# Patient Record
Sex: MALE | Race: WHITE | NOT HISPANIC OR LATINO | Employment: STUDENT | URBAN - METROPOLITAN AREA
[De-identification: names, ages, dates, MRNs, and addresses within clinical notes are randomized per-mention and may not be internally consistent; named-entity substitution may affect disease eponyms.]

---
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
## 2022-01-03 NOTE — PROGRESS NOTES
"[facility]   Physical Therapy Treatment Note     Name: Huber Mahajan  Clinic Number: [id number]    Therapy Diagnosis:   Encounter Diagnoses   Name Primary?    Hip pain     Generalized weakness     Decreased range of motion      Physician: Baldo Davis    Visit Date: 1/5/2022    Physician Orders: PT Eval and Treat   Medical Diagnosis from Referral: S73.191A (ICD-10-CM) - Tear of right acetabular labrum, initial encounter  Evaluation Date: 12/29/2021  Authorization Period Expiration: 12/31/21  Plan of Care Expiration: 3/29/22  Visit # / Visits authorized: 1/ 1     PN DUE: 1/29/22    PTA Visit #: 0/5     Precautions: Standard        Right hip athroscopy with labral repair, femoroplasty, acetabuloplasty  Hip arthroscopy and labral repair protocol  eval and treat with modalities as needed         DOS: 12/20/21  POW: 3  50% weightbearing on the operative extremity for 4 weeks, limit excessive hip flexion and ER/IR    Time In: 1:45  Time Out: 2:30  Total Billable Time: 45 minutes    SUBJECTIVE   History of current condition - Huber reports: to PT s/p R labrum repair on 12/20/21. He states that his initial injury happened about 2 years ago playing football. Pt states that he stepping a hole during a tackling drill and felt an uncomfortable pop in his hip. He states that he kept trying to play on his hip, but later found it hard to squat or do any form of LE work outs. He states that he wanted to maybe try to go back and play football so he elected to have hip surgery. He states that since his surgery his hip has been feeling better, but that it is more "sore" Pt states that he will be going back to school in mid January and will need to transfer to the [facility] at that time.     Pt reports: hip is feeling good. Doing exercises at home with no issues. Has primary c/o lower leg and ankle pain from surgery and compression.  He was compliant with home exercise " program.  Response to previous treatment: did ok  Functional change: walking better with crutches and 50% wb    Pain: 3/10  Location: right groin      OBJECTIVE     Objective Measures updated at progress report unless specified.   GAIT DEVIATIONS: pt ambulates in clinic NWB on B axillary crutches      Hip Range of Motion: ROM testing limited today per protocol and to protect surgical site  Joint Mobility: hypomobility and guarding in R hip     Treatment     Huber received the treatments listed below:      Huber received therapeutic exercises to develop strength, endurance, ROM, flexibility and posture for 40 minutes including:     HEP issued, reviewed, and education provided     glute sets in HL x 20   QS x 20  HS sets 2 x 20 with foam roll under knees  Adductor isometric with ball in HL 2  x 20  Abduction iso with belt 2 X 20  Bridge with ball squeeze 3 X 15  LAQ 3 X 15  Prone knee flexion HS curls 3 X 15     Standing Exercises:  Standing on LLE and moving R:   Abduction x 20  SLR within restricted ROM x 20  B Heel raises 2 X20     Manual STM/joint mobs distal lower leg X 5 min    Patient Education and Home Exercises     Home Exercises Provided and Patient Education Provided     Education provided:   - HEP  Protocol  WB status    Written Home Exercises Provided: Patient instructed to cont prior HEP. Exercises were reviewed and Huber was able to demonstrate them prior to the end of the session.  Huber demonstrated good  understanding of the education provided. See EMR under Patient Instructions for exercises provided during therapy sessions    ASSESSMENT     Pt tolerated activities well today. Responded to STM/ ankle ROM and stretches. Pt demonstrates good understanding re WB status and precautions.    Huber Is progressing well towards his goals.   Pt prognosis is Excellent.     Pt will continue to benefit from skilled outpatient physical therapy to address the deficits listed in the problem list box on initial  evaluation, provide pt/family education and to maximize pt's level of independence in the home and community environment.     Pt's spiritual, cultural and educational needs considered and pt agreeable to plan of care and goals.     Anticipated barriers to physical therapy: none    GOALS: Short Term Goals:  6 weeks  1.Report decreased in pain at worst less than <   / =  2  /10  to increase tolerance for functional activities.   2. Pt to improve R hip flexion range of motion to 90 to allow for improved functional mobility to allow for improvement in IADLs.   3. Increased R hip abduction  MMT 1/2  to increase tolerance for ADL and work activities.  4. Pt to report ability to walk with no AD and no increased pain in R hip   5. Pt will be independent with HEP performance in order to continue PT progress at home.         Long Term Goals:  8-12 weeks  1.Report decreased in pain at worst less than  <   / =  0  /10  to increase tolerance for functional mobility  2.Pt will report ability to perform squat with no pain in R hip   3.Increased R hip flexion and extension MMT 1 grade  to increase tolerance for ADL and work activities.  4. Pt will report 20 % limitation or less on FOTO hip survey  to demonstrate increase in LE function with every day tasks.   5. Pt to be Independent with progressed HEP      PLAN   Plan of care Certification: 12/29/2021 to 3/29/22.     Continue per protocol to increase ROM, strength and WB per protocol and tolerance    Shauna Carrillo, PT

## 2022-01-04 ENCOUNTER — OFFICE VISIT (OUTPATIENT)
Dept: ORTHOPEDICS | Facility: CLINIC | Age: 22
End: 2022-01-04
Payer: COMMERCIAL

## 2022-01-04 VITALS — BODY MASS INDEX: 26.95 KG/M2 | WEIGHT: 199 LBS | HEIGHT: 72 IN

## 2022-01-04 DIAGNOSIS — M25.851 FEMOROACETABULAR IMPINGEMENT OF RIGHT HIP: Primary | ICD-10-CM

## 2022-01-04 PROCEDURE — 3008F PR BODY MASS INDEX (BMI) DOCUMENTED: ICD-10-PCS | Mod: CPTII,S$GLB,, | Performed by: STUDENT IN AN ORGANIZED HEALTH CARE EDUCATION/TRAINING PROGRAM

## 2022-01-04 PROCEDURE — 1160F RVW MEDS BY RX/DR IN RCRD: CPT | Mod: CPTII,S$GLB,, | Performed by: STUDENT IN AN ORGANIZED HEALTH CARE EDUCATION/TRAINING PROGRAM

## 2022-01-04 PROCEDURE — 1159F PR MEDICATION LIST DOCUMENTED IN MEDICAL RECORD: ICD-10-PCS | Mod: CPTII,S$GLB,, | Performed by: STUDENT IN AN ORGANIZED HEALTH CARE EDUCATION/TRAINING PROGRAM

## 2022-01-04 PROCEDURE — 99024 POSTOP FOLLOW-UP VISIT: CPT | Mod: S$GLB,,, | Performed by: STUDENT IN AN ORGANIZED HEALTH CARE EDUCATION/TRAINING PROGRAM

## 2022-01-04 PROCEDURE — 99999 PR PBB SHADOW E&M-EST. PATIENT-LVL III: CPT | Mod: PBBFAC,,, | Performed by: STUDENT IN AN ORGANIZED HEALTH CARE EDUCATION/TRAINING PROGRAM

## 2022-01-04 PROCEDURE — 1159F MED LIST DOCD IN RCRD: CPT | Mod: CPTII,S$GLB,, | Performed by: STUDENT IN AN ORGANIZED HEALTH CARE EDUCATION/TRAINING PROGRAM

## 2022-01-04 PROCEDURE — 99024 PR POST-OP FOLLOW-UP VISIT: ICD-10-PCS | Mod: S$GLB,,, | Performed by: STUDENT IN AN ORGANIZED HEALTH CARE EDUCATION/TRAINING PROGRAM

## 2022-01-04 PROCEDURE — 1160F PR REVIEW ALL MEDS BY PRESCRIBER/CLIN PHARMACIST DOCUMENTED: ICD-10-PCS | Mod: CPTII,S$GLB,, | Performed by: STUDENT IN AN ORGANIZED HEALTH CARE EDUCATION/TRAINING PROGRAM

## 2022-01-04 PROCEDURE — 3008F BODY MASS INDEX DOCD: CPT | Mod: CPTII,S$GLB,, | Performed by: STUDENT IN AN ORGANIZED HEALTH CARE EDUCATION/TRAINING PROGRAM

## 2022-01-04 PROCEDURE — 99999 PR PBB SHADOW E&M-EST. PATIENT-LVL III: ICD-10-PCS | Mod: PBBFAC,,, | Performed by: STUDENT IN AN ORGANIZED HEALTH CARE EDUCATION/TRAINING PROGRAM

## 2022-01-04 NOTE — PROGRESS NOTES
Orthopaedics  Post-operative follow-up    Procedure Performed:   1. Right hip arthroscopic labral repair  2. Right hip arthroscopic femoroplasty  3. Right hip arthroscopic acetabuloplasty  4. Right hip arthroscopic capsular closure    Date of Surgery: 12/20/2021    Subjective: Huber Mahajan is now about 2 weeks out from his hip surgery.  Overall he is doing well. He reports that he has started PT and his hip is doing well. He has been following post-op weight bearing restrictions. He endorses some right knee and ankle pain after surgery that is steadily improving.    Exam:  Sutures removed, portal sites benign with no drainage or redness  Tolerates gentle hip ROM  Ambulating with crutches without difficulty.  No knee effusion, full knee ROM  Vickie's sign negative and no calf tenderness  Full ankle ROM, slightly diminished sensation over the dorsum of the foot compared with the contralateral side    Impression:  Right hip arthroscopic labral repair, femoroplasty, acetabuloplasty, capsular closure, routine healing    Plan:  Discussed surgical findings, operative procedure  Reviewed post-operative instructions, rehabilitation  We discussed his ankle and knee symptoms. They are steadily improving and I anticipate that the symptoms will continue to improve over time.  Weight bearing status: 50% weight bearing  Symptomatic treatment for pain / swelling  Instructed patient to call clinic if questions or concerns    Follow-up with me in 1 month      Baldo Davis MD

## 2022-01-05 ENCOUNTER — CLINICAL SUPPORT (OUTPATIENT)
Dept: REHABILITATION | Facility: HOSPITAL | Age: 22
End: 2022-01-05
Payer: COMMERCIAL

## 2022-01-05 DIAGNOSIS — M25.60 DECREASED RANGE OF MOTION: ICD-10-CM

## 2022-01-05 DIAGNOSIS — M25.559 HIP PAIN: ICD-10-CM

## 2022-01-05 DIAGNOSIS — R53.1 GENERALIZED WEAKNESS: ICD-10-CM

## 2022-01-06 NOTE — PROGRESS NOTES
"[facility]   Physical Therapy Treatment Note     Name: Huber Mahajan  Clinic Number: [id number]    Therapy Diagnosis:   Encounter Diagnoses   Name Primary?    Hip pain     Generalized weakness     Decreased range of motion      Physician: Baldo Davis    Visit Date: 1/7/2022    Physician Orders: PT Eval and Treat   Medical Diagnosis from Referral: S73.191A (ICD-10-CM) - Tear of right acetabular labrum, initial encounter  Evaluation Date: 12/29/2021  Authorization Period Expiration: 12/31/21  Plan of Care Expiration: 3/29/22  Visit # / Visits authorized: 2/ 20     PN DUE: 1/29/22    PTA Visit #: 1/5     Precautions: Standard        Right hip athroscopy with labral repair, femoroplasty, acetabuloplasty  Hip arthroscopy and labral repair protocol  eval and treat with modalities as needed         DOS: 12/20/21  POW: 3  50% weightbearing on the operative extremity for 4 weeks, limit excessive hip flexion and ER/IR    Time In: 9:45am  Time Out: 10:30am  Total Billable Time: 45 minutes    SUBJECTIVE   History of current condition - Huber reports: to PT s/p R labrum repair on 12/20/21. He states that his initial injury happened about 2 years ago playing football. Pt states that he stepping a hole during a tackling drill and felt an uncomfortable pop in his hip. He states that he kept trying to play on his hip, but later found it hard to squat or do any form of LE work outs. He states that he wanted to maybe try to go back and play football so he elected to have hip surgery. He states that since his surgery his hip has been feeling better, but that it is more "sore" Pt states that he will be going back to school in mid January and will need to transfer to the [facility] at that time.     Pt reports: Not as much soreness after last time, doing good today. Ankle is feeling better after last time.    He was compliant with home exercise program.  Response to previous treatment: " did ok  Functional change: walking better with crutches and 50% wb    Pain: 3/10  Location: right groin      OBJECTIVE     Objective Measures updated at progress report unless specified.       Treatment     Huber received the treatments listed below:      Huber received therapeutic exercises to develop strength, endurance, ROM, flexibility and posture for 45 minutes including:     HEP issued, reviewed, and education provided     glute sets in HL x 20   QS x 20  HS sets 2 x 20 with foam roll under knees  Adductor isometric with ball in HL 2  x 20  Abduction iso with belt 2 X 20  Bridge with ball squeeze 3 X 15  LAQ 3 X 15  Prone knee flexion HS curls 3 X 15     Standing Exercises:  Standing on LLE and moving R:   Abduction x 20  SLR within restricted ROM x 20  B Heel raises 2 X20     Manual STM/joint mobs distal lower leg X 0 min    Patient Education and Home Exercises     Home Exercises Provided and Patient Education Provided     Education provided:   - HEP  Protocol  WB status    Written Home Exercises Provided: Patient instructed to cont prior HEP. Exercises were reviewed and Huber was able to demonstrate them prior to the end of the session.  Huber demonstrated good  understanding of the education provided. See EMR under Patient Instructions for exercises provided during therapy sessions    ASSESSMENT     Pt presents today reporting low soreness after last visit and no new c/o. Cont with exercises from previous session to good tolerance. Pt with good muscular fatigue during each exercise. Pt is continuing to progress well with therapy and maintaining his precautions well.    Huber Is progressing well towards his goals.   Pt prognosis is Excellent.     Pt will continue to benefit from skilled outpatient physical therapy to address the deficits listed in the problem list box on initial evaluation, provide pt/family education and to maximize pt's level of independence in the home and community environment.      Pt's spiritual, cultural and educational needs considered and pt agreeable to plan of care and goals.     Anticipated barriers to physical therapy: none    GOALS: Short Term Goals:  6 weeks  1.Report decreased in pain at worst less than <   / =  2  /10  to increase tolerance for functional activities.   2. Pt to improve R hip flexion range of motion to 90 to allow for improved functional mobility to allow for improvement in IADLs.   3. Increased R hip abduction  MMT 1/2  to increase tolerance for ADL and work activities.  4. Pt to report ability to walk with no AD and no increased pain in R hip   5. Pt will be independent with HEP performance in order to continue PT progress at home.         Long Term Goals:  8-12 weeks  1.Report decreased in pain at worst less than  <   / =  0  /10  to increase tolerance for functional mobility  2.Pt will report ability to perform squat with no pain in R hip   3.Increased R hip flexion and extension MMT 1 grade  to increase tolerance for ADL and work activities.  4. Pt will report 20 % limitation or less on FOTO hip survey  to demonstrate increase in LE function with every day tasks.   5. Pt to be Independent with progressed HEP      PLAN   Plan of care Certification: 12/29/2021 to 3/29/22.     Continue per protocol to increase ROM, strength and WB per protocol and tolerance    Nino RAJESH Chery

## 2022-01-07 ENCOUNTER — CLINICAL SUPPORT (OUTPATIENT)
Dept: REHABILITATION | Facility: HOSPITAL | Age: 22
End: 2022-01-07
Payer: COMMERCIAL

## 2022-01-07 DIAGNOSIS — M25.60 DECREASED RANGE OF MOTION: ICD-10-CM

## 2022-01-07 DIAGNOSIS — R53.1 GENERALIZED WEAKNESS: ICD-10-CM

## 2022-01-07 DIAGNOSIS — M25.559 HIP PAIN: ICD-10-CM

## 2022-01-07 PROCEDURE — 97110 THERAPEUTIC EXERCISES: CPT | Mod: PN,CQ

## 2022-01-07 NOTE — PROGRESS NOTES
"[facility]   Physical Therapy Treatment Note     Name: Huber Mahajan  Clinic Number: [id number]    Therapy Diagnosis:   No diagnosis found.  Physician: Baldo Davis*    Visit Date: 1/10/2022    Physician Orders: PT Eval and Treat   Medical Diagnosis from Referral: S73.191A (ICD-10-CM) - Tear of right acetabular labrum, initial encounter  Evaluation Date: 12/29/2021  Authorization Period Expiration: 12/31/21  Plan of Care Expiration: 3/29/22  Visit # / Visits authorized: 2/ 20     PN DUE: 1/29/22    PTA Visit #: 0/5     Precautions: Standard        Right hip athroscopy with labral repair, femoroplasty, acetabuloplasty  Hip arthroscopy and labral repair protocol  eval and treat with modalities as needed         DOS: 12/20/21  POW: 3  50% weightbearing on the operative extremity for 4 weeks, limit excessive hip flexion and ER/IR    Time In: 9:45 am  Time Out: 10:30 am  Total Billable Time: 45 minutes    SUBJECTIVE   History of current condition - Huber reports: to PT s/p R labrum repair on 12/20/21. He states that his initial injury happened about 2 years ago playing football. Pt states that he stepping a hole during a tackling drill and felt an uncomfortable pop in his hip. He states that he kept trying to play on his hip, but later found it hard to squat or do any form of LE work outs. He states that he wanted to maybe try to go back and play football so he elected to have hip surgery. He states that since his surgery his hip has been feeling better, but that it is more "sore" Pt states that he will be going back to school in mid January and will need to transfer to the [facility] at that time.     Pt reports: Not as much soreness after last time, doing good today. Ankle is feeling better after last time.    He was compliant with home exercise program.  Response to previous treatment: did ok  Functional change: walking better with crutches and 50% wb    Pain: " 2/10  Location: right groin      OBJECTIVE     Objective Measures updated at progress report unless specified.       Treatment     Huber received the treatments listed below:      Huber received therapeutic exercises to develop strength, endurance, ROM, flexibility and posture for 45 minutes including:     HEP issued, reviewed, and education provided     glute sets in HL x 20   QS x 20  HS sets 2 x 20 with foam roll under knees  Adductor isometric with ball in HL 2  x 20  Abduction iso with belt 2 X 20  Bridge with ball squeeze 3 X 15  LAQ 3 X 15  Prone knee flexion HS curls 3 X 15     Standing Exercises:  Standing on LLE and moving R:   Abduction x 20  SLR within restricted ROM x 20  Hip extension standing X 20  B Heel raises 4 X20   HS curls 3 X 20  Gastroc stretch 30 sec X2    Manual STM/joint mobs distal lower leg X 0 min    Patient Education and Home Exercises     Home Exercises Provided and Patient Education Provided     Education provided:   - HEP  Protocol  WB status    Written Home Exercises Provided: Patient instructed to cont prior HEP. Exercises were reviewed and Huber was able to demonstrate them prior to the end of the session.  Huber demonstrated good  understanding of the education provided. See EMR under Patient Instructions for exercises provided during therapy sessions    ASSESSMENT     Pt presents today reporting low soreness after last visit and no new c/o. Cont with exercises from previous session to good tolerance. Pt with good muscular fatigue during each exercise. Pt is continuing to progress well with therapy and maintaining his precautions well.    Huber Is progressing well towards his goals.   Pt prognosis is Excellent.     Pt will continue to benefit from skilled outpatient physical therapy to address the deficits listed in the problem list box on initial evaluation, provide pt/family education and to maximize pt's level of independence in the home and community environment.      Pt's spiritual, cultural and educational needs considered and pt agreeable to plan of care and goals.     Anticipated barriers to physical therapy: none    GOALS: Short Term Goals:  6 weeks  1.Report decreased in pain at worst less than <   / =  2  /10  to increase tolerance for functional activities.   2. Pt to improve R hip flexion range of motion to 90 to allow for improved functional mobility to allow for improvement in IADLs.   3. Increased R hip abduction  MMT 1/2  to increase tolerance for ADL and work activities.  4. Pt to report ability to walk with no AD and no increased pain in R hip   5. Pt will be independent with HEP performance in order to continue PT progress at home.         Long Term Goals:  8-12 weeks  1.Report decreased in pain at worst less than  <   / =  0  /10  to increase tolerance for functional mobility  2.Pt will report ability to perform squat with no pain in R hip   3.Increased R hip flexion and extension MMT 1 grade  to increase tolerance for ADL and work activities.  4. Pt will report 20 % limitation or less on FOTO hip survey  to demonstrate increase in LE function with every day tasks.   5. Pt to be Independent with progressed HEP      PLAN   Plan of care Certification: 12/29/2021 to 3/29/22.     Continue per protocol to increase ROM, strength and WB per protocol and tolerance    Shauna Carrillo, PT

## 2022-01-10 ENCOUNTER — CLINICAL SUPPORT (OUTPATIENT)
Dept: REHABILITATION | Facility: HOSPITAL | Age: 22
End: 2022-01-10
Payer: COMMERCIAL

## 2022-01-10 DIAGNOSIS — R53.1 GENERALIZED WEAKNESS: ICD-10-CM

## 2022-01-10 DIAGNOSIS — M25.559 HIP PAIN: ICD-10-CM

## 2022-01-10 DIAGNOSIS — M25.60 DECREASED RANGE OF MOTION: ICD-10-CM

## 2022-01-10 PROCEDURE — 97110 THERAPEUTIC EXERCISES: CPT | Mod: PN

## 2022-01-10 NOTE — PROGRESS NOTES
"[facility]   Physical Therapy Treatment Note     Name: Huber Mahajan  Clinic Number: [id number]    Therapy Diagnosis:   Encounter Diagnoses   Name Primary?    Hip pain     Generalized weakness     Decreased range of motion      Physician: Baldo Davis    Visit Date: 1/12/2022    Physician Orders: PT Eval and Treat   Medical Diagnosis from Referral: S73.191A (ICD-10-CM) - Tear of right acetabular labrum, initial encounter  Evaluation Date: 12/29/2021  Authorization Period Expiration: 12/31/21  Plan of Care Expiration: 3/29/22  Visit # / Visits authorized: 2/ 20     PN DUE: 1/29/22    PTA Visit #: 0/5     Precautions: Standard        Right hip athroscopy with labral repair, femoroplasty, acetabuloplasty  Hip arthroscopy and labral repair protocol  eval and treat with modalities as needed         DOS: 12/20/21  POW: 3  50% weightbearing on the operative extremity for 4 weeks, limit excessive hip flexion and ER/IR    Time In: 9:30 am  Time Out: 10:15 am  Total Billable Time: 45 minutes    SUBJECTIVE   History of current condition - Huber reports: to PT s/p R labrum repair on 12/20/21. He states that his initial injury happened about 2 years ago playing football. Pt states that he stepping a hole during a tackling drill and felt an uncomfortable pop in his hip. He states that he kept trying to play on his hip, but later found it hard to squat or do any form of LE work outs. He states that he wanted to maybe try to go back and play football so he elected to have hip surgery. He states that since his surgery his hip has been feeling better, but that it is more "sore" Pt states that he will be going back to school next week and will need to transfer to the [facility] at that time.     Pt reports: Not as much soreness after last time, doing good today. Ankle is feeling better after last time.    He was compliant with home exercise program.  Response to previous treatment: did " ok  Functional change: walking better with crutches and 50% wb    Pain: 2/10  Location: right groin      OBJECTIVE     Objective Measures updated at progress report unless specified.       Treatment     Huber received the treatments listed below:      Huber received therapeutic exercises to develop strength, endurance, ROM, flexibility and posture for 45 minutes including:     HEP issued, reviewed, and education provided     glute sets in HL x 20   QS x 20  HS sets 2 x 20 with foam roll under knees  Adductor isometric with ball in HL 2  x 20  Abduction iso with belt 2 X 20  Bridge with ball squeeze 3 X 15  LAQ 3 X 15  SAQ on wedge 3 X 15 2#  Glut lifts with feet on swiss ball 1/4 lift 3 X 15  Prone knee flexion HS curls 3 X 15 2#     Standing Exercises:  Standing on LLE and moving R:   Abduction 3 x 15  Hip flexion 3 X 15  Hip extension standing 3 X 15  B Heel raises 3 X20   HS curls 3 X 20  Gastroc stretch 30 sec X2    Manual STM/joint mobs distal lower leg X 0 min    Patient Education and Home Exercises     Home Exercises Provided and Patient Education Provided     Education provided:   - HEP  Protocol  WB status    Written Home Exercises Provided: Patient instructed to cont prior HEP. Exercises were reviewed and Huber was able to demonstrate them prior to the end of the session.  Huber demonstrated good  understanding of the education provided. See EMR under Patient Instructions for exercises provided during therapy sessions    ASSESSMENT     Pt presents today reporting low soreness after last visit and no new c/o. Cont with exercises from previous session to good tolerance with increased reps. Pt with good muscular fatigue during each exercise. Pt is continuing to progress well with therapy and maintaining his precautions well.    Huber Is progressing well towards his goals.   Pt prognosis is Excellent.     Pt will continue to benefit from skilled outpatient physical therapy to address the deficits listed  in the problem list box on initial evaluation, provide pt/family education and to maximize pt's level of independence in the home and community environment.     Pt's spiritual, cultural and educational needs considered and pt agreeable to plan of care and goals.     Anticipated barriers to physical therapy: none    GOALS: Short Term Goals:  6 weeks  1.Report decreased in pain at worst less than <   / =  2  /10  to increase tolerance for functional activities.   2. Pt to improve R hip flexion range of motion to 90 to allow for improved functional mobility to allow for improvement in IADLs.   3. Increased R hip abduction  MMT 1/2  to increase tolerance for ADL and work activities.  4. Pt to report ability to walk with no AD and no increased pain in R hip   5. Pt will be independent with HEP performance in order to continue PT progress at home.         Long Term Goals:  8-12 weeks  1.Report decreased in pain at worst less than  <   / =  0  /10  to increase tolerance for functional mobility  2.Pt will report ability to perform squat with no pain in R hip   3.Increased R hip flexion and extension MMT 1 grade  to increase tolerance for ADL and work activities.  4. Pt will report 20 % limitation or less on FOTO hip survey  to demonstrate increase in LE function with every day tasks.   5. Pt to be Independent with progressed HEP      PLAN   Plan of care Certification: 12/29/2021 to 3/29/22.     Pt transferring to [place] to continue with PT as he is moving back there for school.    Shauna Carrillo, PT

## 2022-01-12 ENCOUNTER — CLINICAL SUPPORT (OUTPATIENT)
Dept: REHABILITATION | Facility: HOSPITAL | Age: 22
End: 2022-01-12
Payer: COMMERCIAL

## 2022-01-12 DIAGNOSIS — R53.1 GENERALIZED WEAKNESS: ICD-10-CM

## 2022-01-12 DIAGNOSIS — M25.60 DECREASED RANGE OF MOTION: ICD-10-CM

## 2022-01-12 DIAGNOSIS — M25.559 HIP PAIN: ICD-10-CM

## 2022-01-12 PROCEDURE — 97110 THERAPEUTIC EXERCISES: CPT | Mod: PN

## 2022-01-13 ENCOUNTER — PATIENT MESSAGE (OUTPATIENT)
Dept: ORTHOPEDICS | Facility: CLINIC | Age: 22
End: 2022-01-13
Payer: COMMERCIAL

## 2022-01-18 ENCOUNTER — CLINICAL SUPPORT (OUTPATIENT)
Dept: REHABILITATION | Facility: HOSPITAL | Age: 22
End: 2022-01-18
Payer: COMMERCIAL

## 2022-01-18 DIAGNOSIS — R53.1 GENERALIZED WEAKNESS: ICD-10-CM

## 2022-01-18 DIAGNOSIS — M25.60 DECREASED RANGE OF MOTION: ICD-10-CM

## 2022-01-18 DIAGNOSIS — M25.559 HIP PAIN: ICD-10-CM

## 2022-01-18 PROCEDURE — 97110 THERAPEUTIC EXERCISES: CPT | Mod: PN

## 2022-01-18 PROCEDURE — 97112 NEUROMUSCULAR REEDUCATION: CPT | Mod: PN

## 2022-01-18 NOTE — PROGRESS NOTES
[facility]   Physical Therapy Treatment Note     Name: Huber Mahajan  [facility] Number: [id number]    Therapy Diagnosis:   Encounter Diagnoses   Name Primary?    Hip pain     Generalized weakness     Decreased range of motion      Physician: Baldo Davis    Visit Date: 1/18/2022    Physician Orders: PT Eval and Treat   Medical Diagnosis from Referral: S73.191A (ICD-10-CM) - Tear of right acetabular labrum, initial encounter  Evaluation Date: 12/29/2021  Authorization Period Expiration: 12/31/21  Plan of Care Expiration: 3/29/22  Visit # / Visits authorized: 5/20  PN DUE: 2/17/2022    Precautions: Standard        Right hip athroscopy with labral repair, femoroplasty, acetabuloplasty  Hip arthroscopy and labral repair protocol  eval and treat with modalities as needed        DOS: 12/20/21  POW: 4      Time In: 2:15 PM  Time Out: 3:00 PM am  Total Billable Time: 45 minutes    SUBJECTIVE   Pt reports: he has been doing well with physical therapy at the [facility] but is coming back to [place] where he goes to school at [employer].  He reports he has been having minimal pain but occasional soreness or achiness in his anterior hip and groin that he reports has been becoming less frequent the further out from surgery he gets.     He was compliant with home exercise program.  Response to previous treatment: did ok  Functional change: walking better with crutches and 50% wb    Pain: 2/10  Location: right groin      OBJECTIVE   Sensation: Light touch: intact to light touch     GAIT: patient arrives today with single crutch gait pattern with good patterning and control     Hip Range of Motion: ROM testing limited today per protocol and to protect surgical site    Right active  Right Passive   Flexion (125) NT 90   Abduction (45) NT 20        Lower Extremity Strength  Left LE   Right LE RLE hip musculature not MMTd/t post operative acuity   Knee extension: 5/5  Knee extension: 4/5   Knee flexion: 5/5 Knee flexion: 4/5   Hip flexion: 4+/5 Hip flexion: NT   Hip Internal Rotation:  4+/5    Hip Internal Rotation: NT      Hip External Rotation: 4+/5    Hip External Rotation: NT      Hip extension:  NT Hip extension: NT   Hip abduction: 5/5 Hip abduction: NT   Hip adduction: 5/5 Hip adduction: NT   Ankle dorsiflexion: 5/5 Ankle dorsiflexion: 5/5   Ankle plantarflexion: 5/5 Ankle plantarflexion: 5/5       Flexibility: R hip flexor moderate restriction ; R hamstring mild restriction        Joint Mobility: hypomobility and guarding in R hip        CMS Impairment/Limitation/Restriction for FOTO Hip Survey     Therapist reviewed FOTO scores for Huber Mahajan on 12/29/2021.   FOTO documents entered into [employer] - see Media section.     Limitation Score: 35%           Treatment     Huber received the treatments listed below:      Huber received therapeutic exercises to develop strength, endurance, ROM, flexibility and posture for 15 minutes including:   - kneeling hip flexor stretch   - weight shifts - fwd/bkw and side/side    Huber received neuromuscular re-education to improve kinesthetic awareness and motor control for 30 minutes, including:   - posterior pelvic tilting   - sidelying clams within range of motion restrictions   - single leg bridge   - 1/4 squats   - sequencing for single crutch gait    Huber received the following manual therapy for 0 minutes:   - none performed today       Patient Education and Home Exercises     Home Exercises Provided and Patient Education Provided     Education provided:   - HEP  Protocol  WB status    Written Home Exercises Provided: Patient instructed to cont prior HEP. Exercises were reviewed and Huber was able to demonstrate them prior to the end of the session.  Huber demonstrated good  understanding of the education provided. See EMR under Patient Instructions for exercises provided during therapy sessions    ASSESSMENT     Patient  transfers to [facility] today after several weeks of care in [facility] s/p hip arthroscopy.  He is about 4 weeks out today and is transitioning out of his phase 1 / maximal protection phase of rehab.  He has minimal to no pain with basic exercises and range of motion is well tolerated all throughout the allowed range.  He did well working through weight acceptance exercises today without pain or difficulty but still displays weakness and endurance deficits.  He will benefit from continued physical therapy care to further reduce his pain and improve his function.    Huber Is progressing well towards his goals.   Pt prognosis is Excellent.     Pt will continue to benefit from skilled outpatient physical therapy to address the deficits listed in the problem list box on initial evaluation, provide pt/family education and to maximize pt's level of independence in the home and community environment.     Pt's spiritual, cultural and educational needs considered and pt agreeable to plan of care and goals.     Anticipated barriers to physical therapy: none    GOALS: Short Term Goals:  6 weeks  1.Report decreased in pain at worst less than <   / =  2  /10  to increase tolerance for functional activities.   2. Pt to improve R hip flexion range of motion to 90 to allow for improved functional mobility to allow for improvement in IADLs.   3. Increased R hip abduction  MMT 1/2  to increase tolerance for ADL and work activities.  4. Pt to report ability to walk with no AD and no increased pain in R hip   5. Pt will be independent with HEP performance in order to continue PT progress at home.         Long Term Goals:  8-12 weeks  1.Report decreased in pain at worst less than  <   / =  0  /10  to increase tolerance for functional mobility  2.Pt will report ability to perform squat with no pain in R hip   3.Increased R hip flexion and extension MMT 1 grade  to increase tolerance for ADL and work activities.  4. Pt will  report 20 % limitation or less on FOTO hip survey  to demonstrate increase in LE function with every day tasks.   5. Pt to be Independent with progressed HEP      PLAN   Plan of care Certification: 12/29/2021 to 3/29/22.       Tim Lee, PT

## 2022-01-20 ENCOUNTER — CLINICAL SUPPORT (OUTPATIENT)
Dept: REHABILITATION | Facility: HOSPITAL | Age: 22
End: 2022-01-20
Payer: COMMERCIAL

## 2022-01-20 DIAGNOSIS — R53.1 GENERALIZED WEAKNESS: ICD-10-CM

## 2022-01-20 DIAGNOSIS — M25.559 HIP PAIN: ICD-10-CM

## 2022-01-20 DIAGNOSIS — M25.60 DECREASED RANGE OF MOTION: ICD-10-CM

## 2022-01-20 PROCEDURE — 97140 MANUAL THERAPY 1/> REGIONS: CPT | Mod: PN

## 2022-01-20 PROCEDURE — 97110 THERAPEUTIC EXERCISES: CPT | Mod: PN

## 2022-01-20 PROCEDURE — 97112 NEUROMUSCULAR REEDUCATION: CPT | Mod: PN

## 2022-01-20 NOTE — PROGRESS NOTES
[facility] OUTPATIENT THERAPY AND WELLNESS   Physical Therapy Treatment Note     Name: Huber Mahajan  Clinic Number: [id number]    Therapy Diagnosis:   Encounter Diagnoses   Name Primary?    Hip pain     Generalized weakness     Decreased range of motion      Physician: Baldo Davis    Visit Date: 1/20/2022    Physician Orders: PT Eval and Treat   Medical Diagnosis from Referral: S73.191A (ICD-10-CM) - Tear of right acetabular labrum, initial encounter  Evaluation Date: 12/29/2021  Authorization Period Expiration: 12/31/21  Plan of Care Expiration: 3/29/22  Visit # / Visits authorized: 5/20  PN DUE: 2/17/2022    Precautions: Standard        Right hip athroscopy with labral repair, femoroplasty, acetabuloplasty  Hip arthroscopy and labral repair protocol  eval and treat with modalities as needed        DOS: 12/20/21  POW: 4      Time In: 2:15 PM  Time Out: 3:00 PM am  Total Billable Time: 45 minutes    SUBJECTIVE   Pt reports: he has been doing well with physical therapy at the [facility] but is coming back to [place] where he goes to school at [employer].  He reports he has been having minimal pain but occasional soreness or achiness in his anterior hip and groin that he reports has been becoming less frequent the further out from surgery he gets.     He was compliant with home exercise program.  Response to previous treatment: did ok  Functional change: walking better with crutches and 50% wb    Pain: 2/10  Location: right groin      OBJECTIVE   To be updated at next progress evaluation.       Treatment     Huber received the treatments listed below:      Huber received therapeutic exercises to develop strength, endurance, ROM, flexibility and posture for 15 minutes including:   - upright bike - 5 minutes - seat height at 12 to abide by range of motion precautions   - kneeling hip flexor stretch - 2 minutes   - weight shifts - fwd/bkw and side/side   - prone hip IR with  knee bent - 3x10    Huber received neuromuscular re-education to improve kinesthetic awareness and motor control for 25 minutes, including:   - planks from knees - verbal cueing and tactile cueing for maintenance of posterior pelvic tilt - 3x30s   - sidelying clams within range of motion restrictions - R - red band - 3x10   - single leg bridge - 3x10   - 1/4 squats - 3x10    Huber received the following manual therapy for 10 minutes:   - Hip mobilizations to reduce pain and improve hip range of motion within restrictions    - circumduction    - anterior mobs in prone      Patient Education and Home Exercises     Home Exercises Provided and Patient Education Provided     Education provided:   - HEP  Protocol  WB status    Written Home Exercises Provided: Patient instructed to cont prior HEP. Exercises were reviewed and Huber was able to demonstrate them prior to the end of the session.  Huber demonstrated good  understanding of the education provided. See EMR under Patient Instructions for exercises provided during therapy sessions    ASSESSMENT     Patient transfers to [facility] today after several weeks of care in [facility] s/p hip arthroscopy.  He is about 4 weeks out today and is transitioning out of his phase 1 / maximal protection phase of rehab.  He has minimal to no pain with basic exercises and range of motion is well tolerated all throughout the allowed range.  He did well working through weight acceptance exercises today without pain or difficulty but still displays weakness and endurance deficits.  He will benefit from continued physical therapy care to further reduce his pain and improve his function.    Huber Is progressing well towards his goals.   Pt prognosis is Excellent.     Pt will continue to benefit from skilled outpatient physical therapy to address the deficits listed in the problem list box on initial evaluation, provide pt/family education and to maximize pt's level of  independence in the home and community environment.     Pt's spiritual, cultural and educational needs considered and pt agreeable to plan of care and goals.     Anticipated barriers to physical therapy: none    GOALS: Short Term Goals:  6 weeks  1.Report decreased in pain at worst less than <   / =  2  /10  to increase tolerance for functional activities.   2. Pt to improve R hip flexion range of motion to 90 to allow for improved functional mobility to allow for improvement in IADLs.   3. Increased R hip abduction  MMT 1/2  to increase tolerance for ADL and work activities.  4. Pt to report ability to walk with no AD and no increased pain in R hip   5. Pt will be independent with HEP performance in order to continue PT progress at home.         Long Term Goals:  8-12 weeks  1.Report decreased in pain at worst less than  <   / =  0  /10  to increase tolerance for functional mobility  2.Pt will report ability to perform squat with no pain in R hip   3.Increased R hip flexion and extension MMT 1 grade  to increase tolerance for ADL and work activities.  4. Pt will report 20 % limitation or less on FOTO hip survey  to demonstrate increase in LE function with every day tasks.   5. Pt to be Independent with progressed HEP      PLAN   Continue to improve strength, neuromuscular control within parameters of protocol.    Plan of care Certification: 12/29/2021 to 3/29/22.       Tim Lee, PT

## 2022-01-25 ENCOUNTER — CLINICAL SUPPORT (OUTPATIENT)
Dept: REHABILITATION | Facility: HOSPITAL | Age: 22
End: 2022-01-25
Payer: COMMERCIAL

## 2022-01-25 DIAGNOSIS — M25.60 DECREASED RANGE OF MOTION: ICD-10-CM

## 2022-01-25 DIAGNOSIS — M25.559 HIP PAIN: ICD-10-CM

## 2022-01-25 DIAGNOSIS — R53.1 GENERALIZED WEAKNESS: ICD-10-CM

## 2022-01-25 PROCEDURE — 97140 MANUAL THERAPY 1/> REGIONS: CPT | Mod: PN

## 2022-01-25 PROCEDURE — 97110 THERAPEUTIC EXERCISES: CPT | Mod: PN

## 2022-01-25 PROCEDURE — 97112 NEUROMUSCULAR REEDUCATION: CPT | Mod: PN

## 2022-01-25 NOTE — PROGRESS NOTES
[facility] OUTPATIENT THERAPY AND WELLNESS   Physical Therapy Treatment Note     Name: Huber Mahajan  Clinic Number: [id number]    Therapy Diagnosis:   Encounter Diagnoses   Name Primary?    Hip pain     Generalized weakness     Decreased range of motion      Physician: Baldo Davis    Visit Date: 1/25/2022    Physician Orders: PT Eval and Treat   Medical Diagnosis from Referral: S73.191A (ICD-10-CM) - Tear of right acetabular labrum, initial encounter  Evaluation Date: 12/29/2021  Authorization Period Expiration: 12/31/21  Plan of Care Expiration: 3/29/22  Visit # / Visits authorized: 7/20  PN DUE: 2/17/2022    Precautions: Standard        Right hip athroscopy with labral repair, femoroplasty, acetabuloplasty  Hip arthroscopy and labral repair protocol  eval and treat with modalities as needed        DOS: 12/20/21    Time In: 2:20 PM  Time Out: 3:05 PM  Total Billable Time: 45 minutes    SUBJECTIVE   Pt reports: he had some gluteal soreness after last visit but no pain or soreness in the hip. He reports his hip is feeling good today.    He was compliant with home exercise program.  Response to previous treatment: did ok  Functional change: walking better with crutches and 50% wb    Pain: 1/10  Location: right groin      OBJECTIVE   To be updated at next progress evaluation.       Treatment     Huber received the treatments listed below:      Huber received therapeutic exercises to develop strength, endurance, ROM, flexibility and posture for 15 minutes including:   - upright bike - 5 minutes - seat height at 12 to abide by range of motion precautions   - kneeling hip flexor stretch - 2 minutes   - weight shifts - fwd/bkw and side/side   - prone hip IR with knee bent - 3x10    Huber received neuromuscular re-education to improve kinesthetic awareness and motor control for 25 minutes, including:   - planks from knees - verbal cueing and tactile cueing for maintenance of posterior pelvic tilt -  3x30s   - sidelying clams within range of motion restrictions - R - red band - 3x10   - single leg bridge - 3x10   - 1/4 squats - 3x10   - TB hip extension B - yellow band - 2x10 - verbal cueing for maintaining pelvic position in neutral during exercise    Huber received the following manual therapy for 10 minutes:   - Hip mobilizations to reduce pain and improve hip range of motion within restrictions    - circumduction    - anterior mobs in prone      Patient Education and Home Exercises     Home Exercises Provided and Patient Education Provided     Education provided:   - importance of consistency with HEP      Written Home Exercises Provided: Patient instructed to cont prior HEP. Exercises were reviewed and Huber was able to demonstrate them prior to the end of the session.  Huber demonstrated good  understanding of the education provided. See EMR under Patient Instructions for exercises provided during therapy sessions    ASSESSMENT     Patient continues to show good progress with improving his strength and neuromuscular control without increased hip pain.  He will benefit from continued care.    Huber Is progressing well towards his goals.   Pt prognosis is Excellent.     Pt will continue to benefit from skilled outpatient physical therapy to address the deficits listed in the problem list box on initial evaluation, provide pt/family education and to maximize pt's level of independence in the home and community environment.     Pt's spiritual, cultural and educational needs considered and pt agreeable to plan of care and goals.     Anticipated barriers to physical therapy: none    GOALS: Short Term Goals:  6 weeks  1.Report decreased in pain at worst less than <   / =  2  /10  to increase tolerance for functional activities.   2. Pt to improve R hip flexion range of motion to 90 to allow for improved functional mobility to allow for improvement in IADLs.   3. Increased R hip abduction  MMT 1/2  to  increase tolerance for ADL and work activities.  4. Pt to report ability to walk with no AD and no increased pain in R hip   5. Pt will be independent with HEP performance in order to continue PT progress at home.         Long Term Goals:  8-12 weeks  1.Report decreased in pain at worst less than  <   / =  0  /10  to increase tolerance for functional mobility  2.Pt will report ability to perform squat with no pain in R hip   3.Increased R hip flexion and extension MMT 1 grade  to increase tolerance for ADL and work activities.  4. Pt will report 20 % limitation or less on FOTO hip survey  to demonstrate increase in LE function with every day tasks.   5. Pt to be Independent with progressed HEP      PLAN   Continue to improve strength, neuromuscular control within parameters of protocol.    Plan of care Certification: 12/29/2021 to 3/29/22.       Tim Lee, PT

## 2022-01-27 ENCOUNTER — CLINICAL SUPPORT (OUTPATIENT)
Dept: REHABILITATION | Facility: HOSPITAL | Age: 22
End: 2022-01-27
Payer: COMMERCIAL

## 2022-01-27 DIAGNOSIS — M25.60 DECREASED RANGE OF MOTION: ICD-10-CM

## 2022-01-27 DIAGNOSIS — R53.1 GENERALIZED WEAKNESS: ICD-10-CM

## 2022-01-27 DIAGNOSIS — M25.559 HIP PAIN: ICD-10-CM

## 2022-01-27 PROCEDURE — 97112 NEUROMUSCULAR REEDUCATION: CPT | Mod: PN

## 2022-01-27 PROCEDURE — 97110 THERAPEUTIC EXERCISES: CPT | Mod: PN

## 2022-01-27 PROCEDURE — 97140 MANUAL THERAPY 1/> REGIONS: CPT | Mod: PN

## 2022-01-27 NOTE — PROGRESS NOTES
[facility]   Physical Therapy Treatment Note     Name: Huber Mahajan  Clinic Number: [id number]    Therapy Diagnosis:   Encounter Diagnoses   Name Primary?    Hip pain     Generalized weakness     Decreased range of motion      Physician: Baldo Davis    Visit Date: 1/27/2022    Physician Orders: PT Eval and Treat   Medical Diagnosis from Referral: S73.191A (ICD-10-CM) - Tear of right acetabular labrum, initial encounter  Evaluation Date: 12/29/2021  Authorization Period Expiration: 12/31/21  Plan of Care Expiration: 3/29/22  Visit # / Visits authorized: 8/20  PN DUE: 2/17/2022    Precautions: Standard        Right hip athroscopy with labral repair, femoroplasty, acetabuloplasty  Hip arthroscopy and labral repair protocol  eval and treat with modalities as needed        DOS: 12/20/21    Time In: 2:20 PM  Time Out: 3:10 PM  Total Billable Time: 50 minutes    SUBJECTIVE   Pt reports: his hip is starting to feel more normal and that he is having very little pain.    He was compliant with home exercise program.  Response to previous treatment: no notable change  Functional change: walking with no pain at full weight-bearing    Pain: 2/10  Location: right groin      OBJECTIVE   To be updated at next progress evaluation.       Treatment     Huber received the treatments listed below:      Huber received therapeutic exercises to develop strength, endurance, ROM, flexibility and posture for 15 minutes including:   - upright bike - 5 minutes - seat height at 12 to abide by range of motion precautions   - kneeling hip flexor stretch - 2 minutes   - prone hip IR with knee bent - 3x10   - Shuttle squats - single leg - 1.0 bands - hip flexion angle <105 degrees    Huber received neuromuscular re-education to improve kinesthetic awareness and motor control for 25 minutes, including:   - planks from knees - verbal cueing and tactile cueing for maintenance of posterior pelvic  tilt - 3x30s   - sidelying clams within range of motion restrictions - B - red band - 3x10   - single leg bridge - 3x10   - 1/4 squats - 3x10   - TB hip extension B - yellow band - 2x10 - verbal cueing for maintaining pelvic position in neutral during exercise    Huber received the following manual therapy for 10 minutes:   - Hip mobilizations to reduce pain and improve hip range of motion within restrictions    - circumduction    - anterior mobs in prone      Patient Education and Home Exercises     Home Exercises Provided and Patient Education Provided     Education provided:   - importance of consistency with HEP      Written Home Exercises Provided: Patient instructed to cont prior HEP. Exercises were reviewed and Huber was able to demonstrate them prior to the end of the session.  Huber demonstrated good  understanding of the education provided. See EMR under Patient Instructions for exercises provided during therapy sessions    ASSESSMENT     Patient continues to demonstrate progress with neuromuscular and pelvis control.  He will benefit from continued physical therapy care.    Huber Is progressing well towards his goals.   Pt prognosis is Excellent.     Pt will continue to benefit from skilled outpatient physical therapy to address the deficits listed in the problem list box on initial evaluation, provide pt/family education and to maximize pt's level of independence in the home and community environment.     Pt's spiritual, cultural and educational needs considered and pt agreeable to plan of care and goals.     Anticipated barriers to physical therapy: none    GOALS: Short Term Goals:  6 weeks  1.Report decreased in pain at worst less than <   / =  2  /10  to increase tolerance for functional activities.   2. Pt to improve R hip flexion range of motion to 90 to allow for improved functional mobility to allow for improvement in IADLs.   3. Increased R hip abduction  MMT 1/2  to increase tolerance for  ADL and work activities.  4. Pt to report ability to walk with no AD and no increased pain in R hip   5. Pt will be independent with HEP performance in order to continue PT progress at home.         Long Term Goals:  8-12 weeks  1.Report decreased in pain at worst less than  <   / =  0  /10  to increase tolerance for functional mobility  2.Pt will report ability to perform squat with no pain in R hip   3.Increased R hip flexion and extension MMT 1 grade  to increase tolerance for ADL and work activities.  4. Pt will report 20 % limitation or less on FOTO hip survey  to demonstrate increase in LE function with every day tasks.   5. Pt to be Independent with progressed HEP      PLAN   Continue to improve strength, neuromuscular control within parameters of protocol.    Plan of care Certification: 12/29/2021 to 3/29/22.       Tim Lee, PT

## 2022-01-28 NOTE — PROGRESS NOTES
[facility]   Physical Therapy Treatment Note     Name: Huber Mahajan  Clinic Number: [id number]    Therapy Diagnosis:   Encounter Diagnoses   Name Primary?    Hip pain     Generalized weakness     Decreased range of motion      Physician: Baldo Davis    Visit Date: 1/31/2022    Physician Orders: PT Eval and Treat   Medical Diagnosis from Referral: S73.191A (ICD-10-CM) - Tear of right acetabular labrum, initial encounter  Evaluation Date: 12/29/2021  Authorization Period Expiration: 12/31/21  Plan of Care Expiration: 3/29/22  Visit # / Visits authorized: 9/20  PN DUE: 2/17/2022  PTA Visit #: 1/5    Precautions: Standard        Right hip athroscopy with labral repair, femoroplasty, acetabuloplasty  Hip arthroscopy and labral repair protocol  eval and treat with modalities as needed        DOS: 12/20/21    Time In: 430 PM  Time Out: 510 PM  Total Billable Time: 40 minutes    SUBJECTIVE     Pt reports: he had some soreness after last session but not bad.    He was compliant with home exercise program.  Response to previous treatment: no notable change  Functional change: walking with no pain at full weight-bearing    Pain: 1/10  Location: right groin     OBJECTIVE     To be updated at next progress evaluation.     Treatment     Huber received the treatments listed below:      Therapeutic exercises to develop strength, endurance, ROM, flexibility and posture for 15 minutes, including:   - Upright bike - 5 minutes - seat height at 12 to abide by range of motion precautions   - Kneeling hip flexor stretch - 2 minutes   - Prone hip IR with knee bent - 3x10   - Shuttle squats - single leg - 1.0 bands - hip flexion angle <105 degrees - 3 x10    Neuromuscular re-education to improve kinesthetic awareness and motor control for 15 minutes, including:   - Planks from knees - verbal cueing and tactile cueing for maintenance of posterior pelvic tilt - 3x30s   - Sidelying clams  within range of motion restrictions - B - red band - 3x10   - Single leg bridge - 3x10   - 1/4 squats - 3x10   - Standing TB hip extension B - yellow band - 2x10 - verbal cueing for maintaining pelvic position in neutral during exercise    Manual therapy for 10 minutes, including:   - Hip mobilizations to reduce pain and improve hip range of motion within restrictions    - Circumduction    - Anterior mobs in prone    Patient Education and Home Exercises     Home Exercises Provided and Patient Education Provided     Education provided:   - importance of consistency with HEP    Written Home Exercises Provided: Patient instructed to cont prior HEP. Exercises were reviewed and Huber was able to demonstrate them prior to the end of the session.  Huber demonstrated good  understanding of the education provided. See EMR under Patient Instructions for exercises provided during therapy sessions    ASSESSMENT     Patient with no difficulty and pain with any of the prescribed exercises. All precautions/restrictions were followed throughout the session. Will continue to progress per protocol.    Huber Is progressing well towards his goals.   Pt prognosis is Excellent.     Pt will continue to benefit from skilled outpatient physical therapy to address the deficits listed in the problem list box on initial evaluation, provide pt/family education and to maximize pt's level of independence in the home and community environment.     Pt's spiritual, cultural and educational needs considered and pt agreeable to plan of care and goals.     Anticipated barriers to physical therapy: none    GOALS: Short Term Goals:  6 weeks  1.Report decreased in pain at worst less than <   / =  2  /10  to increase tolerance for functional activities.   2. Pt to improve R hip flexion range of motion to 90 to allow for improved functional mobility to allow for improvement in IADLs.   3. Increased R hip abduction  MMT 1/2  to increase tolerance for ADL  and work activities.  4. Pt to report ability to walk with no AD and no increased pain in R hip   5. Pt will be independent with HEP performance in order to continue PT progress at home.         Long Term Goals:  8-12 weeks  1.Report decreased in pain at worst less than  <   / =  0  /10  to increase tolerance for functional mobility  2.Pt will report ability to perform squat with no pain in R hip   3.Increased R hip flexion and extension MMT 1 grade  to increase tolerance for ADL and work activities.  4. Pt will report 20 % limitation or less on FOTO hip survey  to demonstrate increase in LE function with every day tasks.   5. Pt to be Independent with progressed HEP      PLAN   Continue to improve strength, neuromuscular control within parameters of protocol.    Plan of care Certification: 12/29/2021 to 3/29/22.       Royal Beasley, PTA

## 2022-01-31 ENCOUNTER — CLINICAL SUPPORT (OUTPATIENT)
Dept: REHABILITATION | Facility: HOSPITAL | Age: 22
End: 2022-01-31
Payer: COMMERCIAL

## 2022-01-31 DIAGNOSIS — M25.559 HIP PAIN: ICD-10-CM

## 2022-01-31 DIAGNOSIS — R53.1 GENERALIZED WEAKNESS: ICD-10-CM

## 2022-01-31 DIAGNOSIS — M25.60 DECREASED RANGE OF MOTION: ICD-10-CM

## 2022-01-31 PROCEDURE — 97112 NEUROMUSCULAR REEDUCATION: CPT | Mod: PN,CQ

## 2022-01-31 PROCEDURE — 97110 THERAPEUTIC EXERCISES: CPT | Mod: PN,CQ

## 2022-01-31 PROCEDURE — 97140 MANUAL THERAPY 1/> REGIONS: CPT | Mod: PN,CQ

## 2022-02-03 ENCOUNTER — CLINICAL SUPPORT (OUTPATIENT)
Dept: REHABILITATION | Facility: HOSPITAL | Age: 22
End: 2022-02-03
Payer: COMMERCIAL

## 2022-02-03 DIAGNOSIS — M25.559 HIP PAIN: ICD-10-CM

## 2022-02-03 DIAGNOSIS — M25.60 DECREASED RANGE OF MOTION: ICD-10-CM

## 2022-02-03 DIAGNOSIS — R53.1 GENERALIZED WEAKNESS: ICD-10-CM

## 2022-02-03 PROCEDURE — 97110 THERAPEUTIC EXERCISES: CPT | Mod: PN

## 2022-02-03 PROCEDURE — 97140 MANUAL THERAPY 1/> REGIONS: CPT | Mod: PN

## 2022-02-03 PROCEDURE — 97112 NEUROMUSCULAR REEDUCATION: CPT | Mod: PN

## 2022-02-03 NOTE — PROGRESS NOTES
[facility]   Physical Therapy Treatment Note     Name: Huber Mahajan  Clinic Number: [id number]    Therapy Diagnosis:   Encounter Diagnoses   Name Primary?    Hip pain     Generalized weakness     Decreased range of motion      Physician: Baldo Davis    Visit Date: 2/3/2022    Physician Orders: PT Eval and Treat   Medical Diagnosis from Referral: S73.191A (ICD-10-CM) - Tear of right acetabular labrum, initial encounter  Evaluation Date: 12/29/2021  Authorization Period Expiration: 12/31/21  Plan of Care Expiration: 3/29/22  Visit # / Visits authorized: 9/20  PN DUE: 2/17/2022    Precautions: Standard        Right hip athroscopy with labral repair, femoroplasty, acetabuloplasty  Hip arthroscopy and labral repair protocol  eval and treat with modalities as needed        DOS: 12/20/21    Time In: 2:15 PM  Time Out: 2:57 PM  Total Billable Time: 42 minutes    SUBJECTIVE     Pt reports: his hip is feeling good today. He denies any pain.    He was compliant with home exercise program.  Response to previous treatment: no notable change  Functional change: walking with no pain at full weight-bearing    Pain: 0/10  Location: right groin     OBJECTIVE     To be updated at next progress evaluation.     Treatment     Huber received the treatments listed below:      Therapeutic exercises to develop strength, endurance, ROM, flexibility and posture for 11 minutes, including:   - Upright bike - 5 minutes - seat height at 12 to abide by range of motion precautions   - Kneeling hip flexor stretch - 2 minutes   - Prone hip IR with knee bent - 3x10   - Shuttle squats - single leg - 1.0 bands - hip flexion angle <105 degrees - 3 x10    Neuromuscular re-education to improve kinesthetic awareness and motor control for 23 minutes, including:   - Planks from knees - verbal cueing and tactile cueing for maintenance of posterior pelvic tilt - 3x30s   - Sidelying clams within range of motion  restrictions - B - red band - 3x10 (not today)   - Single leg bridge with foot elevated on chair - 3x10   - 1/2 squats - 3x10   - Standing TB hip extension B - yellow band - 2x10 - verbal cueing for maintaining pelvic position in neutral during exercise    Manual therapy for 8 minutes, including:   - Hip mobilizations to reduce pain and improve hip range of motion within restrictions    - Circumduction    - Anterior mobs in prone    -mobilizations to improve pain-free hip flexion    Patient Education and Home Exercises     Home Exercises Provided and Patient Education Provided     Education provided:   - importance of consistency with HEP    Written Home Exercises Provided: Patient instructed to cont prior HEP. Exercises were reviewed and Huber was able to demonstrate them prior to the end of the session.  Huber demonstrated good  understanding of the education provided. See EMR under Patient Instructions for exercises provided during therapy sessions    ASSESSMENT     Pain free hip flexion range of motion much improved with core activation. Will continue to work on neuromuscular control to reduce pain and improve functional range of motion.     Huber Is progressing well towards his goals.   Pt prognosis is Excellent.     Pt will continue to benefit from skilled outpatient physical therapy to address the deficits listed in the problem list box on initial evaluation, provide pt/family education and to maximize pt's level of independence in the home and community environment.     Pt's spiritual, cultural and educational needs considered and pt agreeable to plan of care and goals.     Anticipated barriers to physical therapy: none    GOALS: Short Term Goals:  6 weeks  1.Report decreased in pain at worst less than <   / =  2  /10  to increase tolerance for functional activities.   2. Pt to improve R hip flexion range of motion to 90 to allow for improved functional mobility to allow for improvement in IADLs.   3.  Increased R hip abduction  MMT 1/2  to increase tolerance for ADL and work activities.  4. Pt to report ability to walk with no AD and no increased pain in R hip   5. Pt will be independent with HEP performance in order to continue PT progress at home.         Long Term Goals:  8-12 weeks  1.Report decreased in pain at worst less than  <   / =  0  /10  to increase tolerance for functional mobility  2.Pt will report ability to perform squat with no pain in R hip   3.Increased R hip flexion and extension MMT 1 grade  to increase tolerance for ADL and work activities.  4. Pt will report 20 % limitation or less on FOTO hip survey  to demonstrate increase in LE function with every day tasks.   5. Pt to be Independent with progressed HEP      PLAN   Continue to improve strength, neuromuscular control within parameters of protocol.    Plan of care Certification: 12/29/2021 to 3/29/22.       Tim Lee, PT

## 2022-02-04 ENCOUNTER — OFFICE VISIT (OUTPATIENT)
Dept: ORTHOPEDICS | Facility: CLINIC | Age: 22
End: 2022-02-04
Payer: COMMERCIAL

## 2022-02-04 VITALS — HEIGHT: 72 IN | BODY MASS INDEX: 26.95 KG/M2 | WEIGHT: 199 LBS

## 2022-02-04 DIAGNOSIS — M25.851 FEMOROACETABULAR IMPINGEMENT OF RIGHT HIP: Primary | ICD-10-CM

## 2022-02-04 PROCEDURE — 1160F PR REVIEW ALL MEDS BY PRESCRIBER/CLIN PHARMACIST DOCUMENTED: ICD-10-PCS | Mod: CPTII,S$GLB,, | Performed by: STUDENT IN AN ORGANIZED HEALTH CARE EDUCATION/TRAINING PROGRAM

## 2022-02-04 PROCEDURE — 99024 POSTOP FOLLOW-UP VISIT: CPT | Mod: S$GLB,,, | Performed by: STUDENT IN AN ORGANIZED HEALTH CARE EDUCATION/TRAINING PROGRAM

## 2022-02-04 PROCEDURE — 3008F PR BODY MASS INDEX (BMI) DOCUMENTED: ICD-10-PCS | Mod: CPTII,S$GLB,, | Performed by: STUDENT IN AN ORGANIZED HEALTH CARE EDUCATION/TRAINING PROGRAM

## 2022-02-04 PROCEDURE — 99999 PR PBB SHADOW E&M-EST. PATIENT-LVL II: ICD-10-PCS | Mod: PBBFAC,,, | Performed by: STUDENT IN AN ORGANIZED HEALTH CARE EDUCATION/TRAINING PROGRAM

## 2022-02-04 PROCEDURE — 3008F BODY MASS INDEX DOCD: CPT | Mod: CPTII,S$GLB,, | Performed by: STUDENT IN AN ORGANIZED HEALTH CARE EDUCATION/TRAINING PROGRAM

## 2022-02-04 PROCEDURE — 1159F PR MEDICATION LIST DOCUMENTED IN MEDICAL RECORD: ICD-10-PCS | Mod: CPTII,S$GLB,, | Performed by: STUDENT IN AN ORGANIZED HEALTH CARE EDUCATION/TRAINING PROGRAM

## 2022-02-04 PROCEDURE — 1159F MED LIST DOCD IN RCRD: CPT | Mod: CPTII,S$GLB,, | Performed by: STUDENT IN AN ORGANIZED HEALTH CARE EDUCATION/TRAINING PROGRAM

## 2022-02-04 PROCEDURE — 99024 PR POST-OP FOLLOW-UP VISIT: ICD-10-PCS | Mod: S$GLB,,, | Performed by: STUDENT IN AN ORGANIZED HEALTH CARE EDUCATION/TRAINING PROGRAM

## 2022-02-04 PROCEDURE — 1160F RVW MEDS BY RX/DR IN RCRD: CPT | Mod: CPTII,S$GLB,, | Performed by: STUDENT IN AN ORGANIZED HEALTH CARE EDUCATION/TRAINING PROGRAM

## 2022-02-04 PROCEDURE — 99999 PR PBB SHADOW E&M-EST. PATIENT-LVL II: CPT | Mod: PBBFAC,,, | Performed by: STUDENT IN AN ORGANIZED HEALTH CARE EDUCATION/TRAINING PROGRAM

## 2022-02-04 RX ORDER — OMEPRAZOLE 40 MG/1
40 CAPSULE, DELAYED RELEASE ORAL DAILY
Qty: 30 CAPSULE | Refills: 0 | Status: SHIPPED | OUTPATIENT
Start: 2022-02-04 | End: 2022-04-08

## 2022-02-07 NOTE — PROGRESS NOTES
Orthopaedics  Post-operative follow-up    Procedure Performed:   1. Right hip arthroscopic labral repair  2. Right hip arthroscopic femoroplasty  3. Right hip arthroscopic acetabuloplasty  4. Right hip arthroscopic capsular closure    Date of Surgery: 12/20/2021    Subjective: Huber Mahajan is now approximately 6 weeks out from his hip arthroscopy.  He has been compliant with post-operative restrictions and has been progressing with PT.  His pain and function continue to improve.  He reports that his hip is doing great, and his ankle pain has resolved as well.  The only issue that he reports is epigastric stomach pain.  He endorses epigastric pain that is worse at night and also limiting to his appetite.  He reports no changes in color or consistency of stool. This started postoperatively.    Exam:  Incisions well healed with no erythema or induration  Tolerates hip FF to 90  Ambulating without crutches without difficulty.  No knee effusion, full knee ROM  Vickie's sign negative and no calf tenderness  Full ankle ROM, SILT over dorsum of foot    Imaging:  No new imaging studies    Impression:  S/p right hip arthroscopy, labral repair, femoroplasty, acetabuloplasty, and capsular closure, second post-operative visit - doing well    Plan:  I am happy with his postoperative progress.  He is doing very well and reports no issues with the hip.  He has some epigastric pain that sounds like gastritis pre I suspect it is due to use of NSAIDs to prevent heterotopic ossification.  He has now stopped using NSAIDs.  Will also write him a prescription for omeprazole today for 1 month.  If his symptoms do not improve after 1 month, then we will investigate further.  Advance PT per protocol  Symptomatic treatment for pain / swelling  Instructed patient to call clinic if questions or concerns    Follow-up with me in 6 weeks          Baldo Davis MD

## 2022-02-08 ENCOUNTER — CLINICAL SUPPORT (OUTPATIENT)
Dept: REHABILITATION | Facility: HOSPITAL | Age: 22
End: 2022-02-08
Payer: COMMERCIAL

## 2022-02-08 DIAGNOSIS — R53.1 GENERALIZED WEAKNESS: ICD-10-CM

## 2022-02-08 DIAGNOSIS — M25.60 DECREASED RANGE OF MOTION: ICD-10-CM

## 2022-02-08 DIAGNOSIS — M25.559 HIP PAIN: ICD-10-CM

## 2022-02-08 PROCEDURE — 97112 NEUROMUSCULAR REEDUCATION: CPT | Mod: PN

## 2022-02-08 PROCEDURE — 97110 THERAPEUTIC EXERCISES: CPT | Mod: PN

## 2022-02-08 PROCEDURE — 97140 MANUAL THERAPY 1/> REGIONS: CPT | Mod: PN

## 2022-02-08 NOTE — PROGRESS NOTES
[facility]   Physical Therapy Treatment Note     Name: Huber Mahajan  Clinic Number: [id number]    Therapy Diagnosis:   Encounter Diagnoses   Name Primary?    Hip pain     Generalized weakness     Decreased range of motion      Physician: Baldo Davis    Visit Date: 2/8/2022    Physician Orders: PT Eval and Treat   Medical Diagnosis from Referral: S73.191A (ICD-10-CM) - Tear of right acetabular labrum, initial encounter  Evaluation Date: 12/29/2021  Authorization Period Expiration: 12/31/21  Plan of Care Expiration: 3/29/22  Visit # / Visits authorized: 9/20  PN DUE: 2/17/2022    Precautions: Standard        Right hip athroscopy with labral repair, femoroplasty, acetabuloplasty  Hip arthroscopy and labral repair protocol  eval and treat with modalities as needed        DOS: 12/20/21    Time In: 2:12 PM  Time Out: 3:00 PM  Total Billable Time: 48 minutes    SUBJECTIVE     Pt reports: his hip is feeling good today and that he is having no hip pain. He reports he has been having some cramping in his R calf with the increased walking he has been doing at school.  He reports his MD appointment last week went well.    He was compliant with home exercise program.  Response to previous treatment: no notable change  Functional change: walking with no pain at full weight-bearing    Pain: 0/10  Location: right groin     OBJECTIVE     To be updated at next progress evaluation.     Treatment     Huber received the treatments listed below:      Therapeutic exercises to develop strength, endurance, ROM, flexibility and posture for 8 minutes, including:   - Upright bike - 5 minutes   - R gastroc stretch - 3 minutes   - Kneeling hip flexor stretch - 2 minutes  (not performed today)   - Prone hip IR with knee bent - 3x10 (not performed today)   - Shuttle squats - single leg - 1.0 bands (not performed today)    Neuromuscular re-education to improve kinesthetic awareness and motor  control for 25 minutes, including:   - Planks from knees - verbal cueing and tactile cueing for maintenance of posterior pelvic tilt - 1x1 minute   - Side Planks from knees - verbal cueing and tactile cueing for maintenance of posterior pelvic tilt - 1x1 minute   - Wall clams - B - red band - 3x10   - Banded side steps - red band - 3 laps   - Step Ups - R - 6 inch with towel target for rear foot - 3x10   - Single leg bridge with foot elevated on chair - 2x10    Manual therapy for 15 minutes, including:   - Hip mobilizations to reduce pain and improve hip range of motion within restrictions    - Circumduction    - Anterior mobs in prone    - mobilizations to improve pain-free hip flexion    - IASTM R calf    Patient Education and Home Exercises     Home Exercises Provided and Patient Education Provided     Education provided:   - importance of consistency with HEP    Written Home Exercises Provided: Patient instructed to cont prior HEP. Exercises were reviewed and Hbuer was able to demonstrate them prior to the end of the session.  Huber demonstrated good  understanding of the education provided. See EMR under Patient Instructions for exercises provided during therapy sessions    ASSESSMENT     Patient tolerated today's treatment session well. Focused on neuromuscular control of gluteal/hip control as well as pelvis/hip dissociation.  He will benefit from continued physical therapy care.    Huber Is progressing well towards his goals.   Pt prognosis is Excellent.     Pt will continue to benefit from skilled outpatient physical therapy to address the deficits listed in the problem list box on initial evaluation, provide pt/family education and to maximize pt's level of independence in the home and community environment.     Pt's spiritual, cultural and educational needs considered and pt agreeable to plan of care and goals.     Anticipated barriers to physical therapy: none    GOALS: Short Term Goals:  6  weeks  1.Report decreased in pain at worst less than <   / =  2  /10  to increase tolerance for functional activities.   2. Pt to improve R hip flexion range of motion to 90 to allow for improved functional mobility to allow for improvement in IADLs.   3. Increased R hip abduction  MMT 1/2  to increase tolerance for ADL and work activities.  4. Pt to report ability to walk with no AD and no increased pain in R hip   5. Pt will be independent with HEP performance in order to continue PT progress at home.         Long Term Goals:  8-12 weeks  1.Report decreased in pain at worst less than  <   / =  0  /10  to increase tolerance for functional mobility  2.Pt will report ability to perform squat with no pain in R hip   3.Increased R hip flexion and extension MMT 1 grade  to increase tolerance for ADL and work activities.  4. Pt will report 20 % limitation or less on FOTO hip survey  to demonstrate increase in LE function with every day tasks.   5. Pt to be Independent with progressed HEP      PLAN   Continue to improve strength, neuromuscular control within parameters of protocol.    Plan of care Certification: 12/29/2021 to 3/29/22.       Tim Lee, PT

## 2022-02-10 ENCOUNTER — CLINICAL SUPPORT (OUTPATIENT)
Dept: REHABILITATION | Facility: HOSPITAL | Age: 22
End: 2022-02-10
Payer: COMMERCIAL

## 2022-02-10 DIAGNOSIS — M25.559 HIP PAIN: ICD-10-CM

## 2022-02-10 DIAGNOSIS — R53.1 GENERALIZED WEAKNESS: ICD-10-CM

## 2022-02-10 DIAGNOSIS — M25.60 DECREASED RANGE OF MOTION: ICD-10-CM

## 2022-02-10 PROCEDURE — 97112 NEUROMUSCULAR REEDUCATION: CPT | Mod: PN

## 2022-02-10 PROCEDURE — 97140 MANUAL THERAPY 1/> REGIONS: CPT | Mod: PN

## 2022-02-10 PROCEDURE — 97110 THERAPEUTIC EXERCISES: CPT | Mod: PN

## 2022-02-10 NOTE — PROGRESS NOTES
[facility]   Physical Therapy Treatment Note     Name: Huber Mahajan  Clinic Number: [id number]    Therapy Diagnosis:   Encounter Diagnoses   Name Primary?    Hip pain     Generalized weakness     Decreased range of motion      Physician: Baldo Davis    Visit Date: 2/10/2022  Physician Orders: PT Eval and Treat   Medical Diagnosis from Referral: S73.191A (ICD-10-CM) - Tear of right acetabular labrum, initial encounter  Evaluation Date: 12/29/2021  Authorization Period Expiration: 12/31/21  Plan of Care Expiration: 3/29/22  Visit # / Visits authorized: 12/20 (1/1 eval)  PN DUE: 2/17/2022    Precautions: Standard        Right hip athroscopy with labral repair, femoroplasty, acetabuloplasty  Hip arthroscopy and labral repair protocol  eval and treat with modalities as needed        DOS: 12/20/21    Time In: 2:15 PM  Time Out: 3:02 PM  Total Billable Time: 47 minutes    SUBJECTIVE     Pt reports: he is nearly pain-free today. He reports muscular soreness after last visit but that he is feeling good today.    He was compliant with home exercise program.  Response to previous treatment: mild gluteal and hamstring soreness  Functional change: walking with no pain at full weight-bearing    Pain: 1/10  Location: right groin     OBJECTIVE     To be updated at next progress evaluation.     Treatment     Huber received the treatments listed below:      Therapeutic exercises to develop strength, endurance, ROM, flexibility and posture for 10 minutes, including:   - Upright bike - 5 minutes   - Kneeling hip flexor stretch - 2 minutes    - Prone hip IR with knee bent - 3x10   - Shuttle squats - single leg - 3.0 bands    Neuromuscular re-education to improve kinesthetic awareness and motor control for 25 minutes, including:   - Planks from knees - verbal cueing and tactile cueing for maintenance of posterior pelvic tilt - 1x1 minute   - Side Planks from knees - verbal cueing  and tactile cueing for maintenance of posterior pelvic tilt - 1x1 minute   - Wall clams - B - red band - 3x10   - Banded side steps - red band - 3 laps   - Step Ups - B - 6 inch with towel target for rear foot - 3x10   - Single leg bridge with foot elevated on chair - 2x10 (not performed today)   - BOSU Weight shifts - side-to-side - 3x10    Manual therapy for 12 minutes, including:   - Hip mobilizations to reduce pain and improve hip range of motion within restrictions    - Circumduction    - Anterior mobs in prone    - mobilizations to improve pain-free hip flexion    - IASTM R calf    Patient Education and Home Exercises     Home Exercises Provided and Patient Education Provided     Education provided:   - importance of consistency with HEP    Written Home Exercises Provided: Patient instructed to cont prior HEP. Exercises were reviewed and Huber was able to demonstrate them prior to the end of the session.  Huber demonstrated good  understanding of the education provided. See EMR under Patient Instructions for exercises provided during therapy sessions    ASSESSMENT     Patient continues to show good progress with his strength and neuromuscular control.  Impaired proprioception evident with BOSU weight shifts. Gluteal fatigue still noted at the conclusion of today's session.  He will benefit from continued care.    Huber Is progressing well towards his goals.   Pt prognosis is Excellent.     Pt will continue to benefit from skilled outpatient physical therapy to address the deficits listed in the problem list box on initial evaluation, provide pt/family education and to maximize pt's level of independence in the home and community environment.     Pt's spiritual, cultural and educational needs considered and pt agreeable to plan of care and goals.     Anticipated barriers to physical therapy: none    GOALS: Short Term Goals:  6 weeks  1.Report decreased in pain at worst less than <   / =  2  /10  to increase  tolerance for functional activities.   2. Pt to improve R hip flexion range of motion to 90 to allow for improved functional mobility to allow for improvement in IADLs.   3. Increased R hip abduction  MMT 1/2  to increase tolerance for ADL and work activities.  4. Pt to report ability to walk with no AD and no increased pain in R hip   5. Pt will be independent with HEP performance in order to continue PT progress at home.         Long Term Goals:  8-12 weeks  1.Report decreased in pain at worst less than  <   / =  0  /10  to increase tolerance for functional mobility  2.Pt will report ability to perform squat with no pain in R hip   3.Increased R hip flexion and extension MMT 1 grade  to increase tolerance for ADL and work activities.  4. Pt will report 20 % limitation or less on FOTO hip survey  to demonstrate increase in LE function with every day tasks.   5. Pt to be Independent with progressed HEP      PLAN   Continue to improve strength, neuromuscular control within parameters of protocol.    Plan of care Certification: 12/29/2021 to 3/29/22.       Tim Lee, PT

## 2022-02-15 ENCOUNTER — CLINICAL SUPPORT (OUTPATIENT)
Dept: REHABILITATION | Facility: HOSPITAL | Age: 22
End: 2022-02-15
Payer: COMMERCIAL

## 2022-02-15 DIAGNOSIS — M25.559 HIP PAIN: ICD-10-CM

## 2022-02-15 DIAGNOSIS — R53.1 GENERALIZED WEAKNESS: ICD-10-CM

## 2022-02-15 DIAGNOSIS — M25.60 DECREASED RANGE OF MOTION: ICD-10-CM

## 2022-02-15 PROCEDURE — 97112 NEUROMUSCULAR REEDUCATION: CPT | Mod: PN

## 2022-02-15 PROCEDURE — 97140 MANUAL THERAPY 1/> REGIONS: CPT | Mod: PN

## 2022-02-15 PROCEDURE — 97110 THERAPEUTIC EXERCISES: CPT | Mod: PN

## 2022-02-15 NOTE — PROGRESS NOTES
[facility]   Physical Therapy Treatment Note     Name: Huber Mahajan  Clinic Number: [id number]    Therapy Diagnosis:   Encounter Diagnoses   Name Primary?    Hip pain     Generalized weakness     Decreased range of motion      Physician: Baldo Davis    Visit Date: 2/15/2022  Physician Orders: PT Eval and Treat   Medical Diagnosis from Referral: S73.191A (ICD-10-CM) - Tear of right acetabular labrum, initial encounter  Evaluation Date: 12/29/2021  Authorization Period Expiration: 12/31/21  Plan of Care Expiration: 3/29/22  Visit # / Visits authorized: 13/20 (1/1 eval)  PN DUE: 2/17/2022    Precautions: Standard        Right hip athroscopy with labral repair, femoroplasty, acetabuloplasty  Hip arthroscopy and labral repair protocol  eval and treat with modalities as needed        DOS: 12/20/21    Time In: 2:15 PM  Time Out: 3:03 PM  Total Billable Time: 48 minutes    SUBJECTIVE     Pt reports: his hip has been feeling pretty good lately. He denies any pain or soreness after last visit.     He was compliant with home exercise program.  Response to previous treatment: mild gluteal and hamstring soreness  Functional change: walking with no pain at full weight-bearing    Pain: 1/10  Location: right groin     OBJECTIVE     To be updated at next progress evaluation.     Treatment     Huber received the treatments listed below:      Therapeutic exercises to develop strength, endurance, ROM, flexibility and posture for 10 minutes, including:   - Upright bike - 5 minutes   - Kneeling hip flexor stretch - 2 minutes    - Prone hip IR with knee bent - 3x10   - Shuttle squats - single leg - 3.0 bands (not performed today)    Neuromuscular re-education to improve kinesthetic awareness and motor control for 26 minutes, including:   - Planks from knees - verbal cueing and tactile cueing for maintenance of posterior pelvic tilt - 1x1 minute   - Side Planks from knees - verbal  cueing and tactile cueing for maintenance of posterior pelvic tilt - 1x1 minute   - Wall clams - B - red band - 3x10   - Banded side steps - red band - 3 laps   - Single leg bridge with foot elevated on chair - 2x10   - BOSU Squats - 3x10   - Single leg standing with knee bent - 3x30s   - Step ups - 8 inch - 3x10    Manual therapy for 12 minutes, including:   - Hip mobilizations to reduce pain and improve hip range of motion within restrictions    - Circumduction    - Anterior mobs in prone    - mobilizations to improve pain-free hip flexion    - IASTM R calf    Patient Education and Home Exercises     Home Exercises Provided and Patient Education Provided     Education provided:   - importance of consistency with HEP    Written Home Exercises Provided: Patient instructed to cont prior HEP. Exercises were reviewed and Huber was able to demonstrate them prior to the end of the session.  Huber demonstrated good  understanding of the education provided. See EMR under Patient Instructions for exercises provided during therapy sessions    ASSESSMENT     Patient continues to demonstrate good progress with neuromuscular control and strength. He is progressing well.    Huber Is progressing well towards his goals.   Pt prognosis is Excellent.     Pt will continue to benefit from skilled outpatient physical therapy to address the deficits listed in the problem list box on initial evaluation, provide pt/family education and to maximize pt's level of independence in the home and community environment.     Pt's spiritual, cultural and educational needs considered and pt agreeable to plan of care and goals.     Anticipated barriers to physical therapy: none    GOALS: Short Term Goals:  6 weeks  1.Report decreased in pain at worst less than <   / =  2  /10  to increase tolerance for functional activities.   2. Pt to improve R hip flexion range of motion to 90 to allow for improved functional mobility to allow for improvement in  IADLs.   3. Increased R hip abduction  MMT 1/2  to increase tolerance for ADL and work activities.  4. Pt to report ability to walk with no AD and no increased pain in R hip   5. Pt will be independent with HEP performance in order to continue PT progress at home.         Long Term Goals:  8-12 weeks  1.Report decreased in pain at worst less than  <   / =  0  /10  to increase tolerance for functional mobility  2.Pt will report ability to perform squat with no pain in R hip   3.Increased R hip flexion and extension MMT 1 grade  to increase tolerance for ADL and work activities.  4. Pt will report 20 % limitation or less on FOTO hip survey  to demonstrate increase in LE function with every day tasks.   5. Pt to be Independent with progressed HEP      PLAN   Continue to improve strength, neuromuscular control within parameters of protocol.    Plan of care Certification: 12/29/2021 to 3/29/22.       Tim Lee, PT

## 2022-02-18 ENCOUNTER — CLINICAL SUPPORT (OUTPATIENT)
Dept: REHABILITATION | Facility: HOSPITAL | Age: 22
End: 2022-02-18
Payer: COMMERCIAL

## 2022-02-18 DIAGNOSIS — M25.559 HIP PAIN: ICD-10-CM

## 2022-02-18 DIAGNOSIS — M25.60 DECREASED RANGE OF MOTION: ICD-10-CM

## 2022-02-18 DIAGNOSIS — R53.1 GENERALIZED WEAKNESS: ICD-10-CM

## 2022-02-18 PROCEDURE — 97110 THERAPEUTIC EXERCISES: CPT | Mod: PN

## 2022-02-18 PROCEDURE — 97112 NEUROMUSCULAR REEDUCATION: CPT | Mod: PN

## 2022-02-18 NOTE — PROGRESS NOTES
[facility]   Physical Therapy Treatment Note     Name: Huber Mahajan  [facility] Number: [id number]    Therapy Diagnosis:   Encounter Diagnoses   Name Primary?    Hip pain     Generalized weakness     Decreased range of motion      Physician: Baldo Davis    Visit Date: 2022  Physician Orders: PT Eval and Treat   Medical Diagnosis from Referral: S73.191A (ICD-10-CM) - Tear of right acetabular labrum, initial encounter  Evaluation Date: 2021  Authorization Period Expiration: 21  Plan of Care Expiration: 3/29/22  Visit # / Visits authorized:  ( eval)  PN DUE: 3/20/2022    Precautions: Standard        Right hip athroscopy with labral repair, femoroplasty, acetabuloplasty  Hip arthroscopy and labral repair protocol  eval and treat with modalities as needed        DOS: 21    Time In: 10:15 AM  Time Out: 11:05 AM  Total Billable Time: 50 minutes    SUBJECTIVE     Pt reports: Patient reports he is doing much better and feels about 90% better overall.  He reports he has resumed all daily activities but has not resumed any sports activities. He is satisfied with his progress so far.    He was compliant with home exercise program.  Response to previous treatment: mild gluteal and hamstring soreness  Functional change: walking with no pain at full weight-bearing    Pain: 1/10  Location: non-specific hip pain    OBJECTIVE     Sensation: Light touch: intact to light touch     GAIT:  Good gait pattern with no pain     Hip Range of Motion: ROM testing limited today per protocol and to protect surgical site    Right active  Right Passive   Flexion (125) 125 133   Abduction (45) 45 50     IR at 90 35 NT   ER at 90 55 NT        Lower Extremity Strength  Left LE   Right LE RLE hip musculature not MMTd/t post operative acuity   Knee extension: 5/5 Knee extension: 5/5   Knee flexion: 5/5 Knee flexion: 5/5   Hip flexion in sittin.5 Hip flexion: 90.8    Hip Internal Rotation sittin.7    Hip Internal Rotation: 48.8      Hip External Rotation sittin.7    Hip External Rotation: 48.3      Hip extension in prone:  40.6 Hip extension: 35.3   Hip abduction in sidelyin.6 Hip abduction: 26.3   Hip adduction in hooklyin Hip adduction: 50.2   Ankle dorsiflexion: 5/5 Ankle dorsiflexion: 5/5   Ankle plantarflexion: 5/5 Ankle plantarflexion: 5/5       Flexibility: R hip flexor moderate restriction ; R hamstring mild restriction        Joint Mobility: mild hypomobility to PA hip mobs        CMS Impairment/Limitation/Restriction for FOTO Hip Survey     Therapist reviewed FOTO scores for Huber Mahajan on 2022.   FOTO documents entered into [employer] - see Media section.     Limitation Score: 34%                Treatment     Huber received the treatments listed below:      Therapeutic exercises to develop strength, endurance, ROM, flexibility and posture for 12 minutes, including:   - Upright bike - 5 minutes   - Kneeling hip flexor stretch - 2 minutes    - Prone hip IR with knee bent - 3x10   - Shuttle squats - single leg - 3.0 bands (not performed today)    Neuromuscular re-education to improve kinesthetic awareness and motor control for 38 minutes, including:   - Front Planks - verbal cueing and tactile cueing for maintenance of posterior pelvic tilt - 1x1 minute   - Side Planks - B - verbal cueing and tactile cueing for maintenance of posterior pelvic tilt - 1x1 minute   - Wall clams - B - red band - 3x10   - Banded side steps - red band - 3 laps   - Single leg bridge with foot elevated on chair - 2x10   - BOSU Squats - 3x10   - Single leg standing with knee bent - 3x30s   - Step ups - 8 inch - 3x10    Manual therapy for 0 minutes, including:   - Hip mobilizations to reduce pain and improve hip range of motion within restrictions (not performed today)    - Circumduction    - Anterior mobs in prone    - mobilizations to improve pain-free hip flexion    -  IASTM R calf    Patient Education and Home Exercises     Home Exercises Provided and Patient Education Provided     Education provided:   - importance of consistency with HEP    Written Home Exercises Provided: Patient instructed to cont prior HEP. Exercises were reviewed and Huber was able to demonstrate them prior to the end of the session.  Huber demonstrated good  understanding of the education provided. See EMR under Patient Instructions for exercises provided during therapy sessions    ASSESSMENT     Patient demonstrates objective improvement in his range of motion and strenght.  R hip IR strength is currently his greatest deficit.  Will continue to work on improving strength and neuromuscular control.  He will benefit from continued physical therapy care.    Huber Is progressing well towards his goals.   Pt prognosis is Excellent.     Pt will continue to benefit from skilled outpatient physical therapy to address the deficits listed in the problem list box on initial evaluation, provide pt/family education and to maximize pt's level of independence in the home and community environment.     Pt's spiritual, cultural and educational needs considered and pt agreeable to plan of care and goals.     Anticipated barriers to physical therapy: none    GOALS: Short Term Goals:  6 weeks  1.Report decreased in pain at worst less than <   / =  2  /10  to increase tolerance for functional activities. (met, 2/18/22)  2. Pt to improve R hip flexion range of motion to 90 to allow for improved functional mobility to allow for improvement in IADLs. (met)  3. Increased R hip abduction  MMT 1/2  to increase tolerance for ADL and work activities. (met, 2/18/22)  4. Pt to report ability to walk with no AD and no increased pain in R hip (met, 2/18/22)  5. Pt will be independent with HEP performance in order to continue PT progress at home. (2/18/22)        Long Term Goals:  8-12 weeks  1.Report decreased in pain at worst less  than  <   / =  0  /10  to increase tolerance for functional mobility (progressing)  2.Pt will report ability to perform squat with no pain in R hip (met, 2/18/22)  3.Increased R hip flexion and extension MMT 1 grade  to increase tolerance for ADL and work activities. (met, 2/18/22)  4. Pt will report 20 % limitation or less on FOTO hip survey  to demonstrate increase in LE function with every day tasks. (progressing)  5. Pt to be Independent with progressed HEP (progressing)      PLAN   Continue to improve strength, neuromuscular control within parameters of protocol.    Plan of care Certification: 12/29/2021 to 3/29/22.       Tim Lee, PT

## 2022-02-22 ENCOUNTER — CLINICAL SUPPORT (OUTPATIENT)
Dept: REHABILITATION | Facility: HOSPITAL | Age: 22
End: 2022-02-22
Payer: COMMERCIAL

## 2022-02-22 DIAGNOSIS — M25.559 HIP PAIN: Primary | ICD-10-CM

## 2022-02-22 DIAGNOSIS — M25.60 DECREASED RANGE OF MOTION: ICD-10-CM

## 2022-02-22 DIAGNOSIS — R53.1 GENERALIZED WEAKNESS: ICD-10-CM

## 2022-02-22 PROCEDURE — 97110 THERAPEUTIC EXERCISES: CPT | Mod: PN

## 2022-02-22 PROCEDURE — 97112 NEUROMUSCULAR REEDUCATION: CPT | Mod: PN

## 2022-02-22 PROCEDURE — 97140 MANUAL THERAPY 1/> REGIONS: CPT | Mod: PN

## 2022-02-22 NOTE — PROGRESS NOTES
[facility]   Physical Therapy Treatment Note     Name: Huber Mahajan  Clinic Number: [id number]    Therapy Diagnosis:   Encounter Diagnoses   Name Primary?    Hip pain Yes    Generalized weakness     Decreased range of motion      Physician: Baldo Davis    Visit Date: 2/22/2022  Physician Orders: PT Eval and Treat   Medical Diagnosis from Referral: S73.191A (ICD-10-CM) - Tear of right acetabular labrum, initial encounter  Evaluation Date: 12/29/2021  Authorization Period Expiration: 12/31/21  Plan of Care Expiration: 3/29/22  Visit # / Visits authorized: 15/20 (1/1 eval)  PN DUE: 3/20/2022    Precautions: Standard        Right hip athroscopy with labral repair, femoroplasty, acetabuloplasty  Hip arthroscopy and labral repair protocol  eval and treat with modalities as needed        DOS: 12/20/21    Time In: 2:15 PM  Time Out: 3:00 PM  Total Billable Time: 45 minutes    SUBJECTIVE     Pt reports: he was somewhat sore after last visit but reports that his hip continues to improve overall.    He was compliant with home exercise program.  Response to previous treatment: no notable change  Functional change: no notable change    Pain: 1/10  Location: non-specific, almost non-noticeable hip pain    OBJECTIVE     To be updated at next progress evaluation unless otherwise specified.        Treatment     Huber received the treatments listed below:      Therapeutic exercises to develop strength, endurance, ROM, flexibility and posture for 10 minutes, including:   - Upright bike - 5 minutes   - Kneeling hip flexor stretch - 2 minutes    - Prone resisted hip IR with knee bent - green band - 3x10   - Shuttle squats - single leg - 4.0 bands    Neuromuscular re-education to improve kinesthetic awareness and motor control for 25 minutes, including:   - Front Planks - verbal cueing and tactile cueing for maintenance of posterior pelvic tilt - 1x1 minute   - Side Planks - B  - verbal cueing and tactile cueing for maintenance of posterior pelvic tilt - 1x1 minute   - Wall clams - B - red band - 3x10   - Banded side steps - red band - 3 laps   - Single leg bridge with foot elevated on chair - 2x10   - BOSU Squats - 3x10   - Single leg standing with knee bent with ball toss - 3x30s   - Step ups - 8 inch - 3x10    Manual therapy for 10 minutes, including:   - Hip mobilizations to reduce pain and improve hip range of motion within restrictions    - inferior glides to improve pain free hip flexion    Patient Education and Home Exercises     Home Exercises Provided and Patient Education Provided     Education provided:   - importance of consistency with HEP    Written Home Exercises Provided: Patient instructed to cont prior HEP. Exercises were reviewed and Huber was able to demonstrate them prior to the end of the session.  Huber demonstrated good  understanding of the education provided. See EMR under Patient Instructions for exercises provided during therapy sessions    ASSESSMENT     Patient demonstrates objective improvement in his range of motion and strenght.  R hip IR strength is currently his greatest deficit.  Will continue to work on improving strength and neuromuscular control.  He will benefit from continued physical therapy care.    Huber Is progressing well towards his goals.   Pt prognosis is Excellent.     Pt will continue to benefit from skilled outpatient physical therapy to address the deficits listed in the problem list box on initial evaluation, provide pt/family education and to maximize pt's level of independence in the home and community environment.     Pt's spiritual, cultural and educational needs considered and pt agreeable to plan of care and goals.     Anticipated barriers to physical therapy: none    GOALS: Short Term Goals:  6 weeks  1.Report decreased in pain at worst less than <   / =  2  /10  to increase tolerance for functional activities. (met,  2/18/22)  2. Pt to improve R hip flexion range of motion to 90 to allow for improved functional mobility to allow for improvement in IADLs. (met)  3. Increased R hip abduction  MMT 1/2  to increase tolerance for ADL and work activities. (met, 2/18/22)  4. Pt to report ability to walk with no AD and no increased pain in R hip (met, 2/18/22)  5. Pt will be independent with HEP performance in order to continue PT progress at home. (2/18/22)        Long Term Goals:  8-12 weeks  1.Report decreased in pain at worst less than  <   / =  0  /10  to increase tolerance for functional mobility (progressing)  2.Pt will report ability to perform squat with no pain in R hip (met, 2/18/22)  3.Increased R hip flexion and extension MMT 1 grade  to increase tolerance for ADL and work activities. (met, 2/18/22)  4. Pt will report 20 % limitation or less on FOTO hip survey  to demonstrate increase in LE function with every day tasks. (progressing)  5. Pt to be Independent with progressed HEP (progressing)      PLAN   Continue to improve strength, neuromuscular control within parameters of protocol.    Plan of care Certification: 12/29/2021 to 3/29/22.       Tim Lee, PT

## 2022-02-24 ENCOUNTER — CLINICAL SUPPORT (OUTPATIENT)
Dept: REHABILITATION | Facility: HOSPITAL | Age: 22
End: 2022-02-24
Payer: COMMERCIAL

## 2022-02-24 DIAGNOSIS — M25.60 DECREASED RANGE OF MOTION: ICD-10-CM

## 2022-02-24 DIAGNOSIS — M25.559 HIP PAIN: Primary | ICD-10-CM

## 2022-02-24 DIAGNOSIS — R53.1 GENERALIZED WEAKNESS: ICD-10-CM

## 2022-02-24 PROCEDURE — 97140 MANUAL THERAPY 1/> REGIONS: CPT | Mod: PN

## 2022-02-24 PROCEDURE — 97110 THERAPEUTIC EXERCISES: CPT | Mod: PN

## 2022-02-24 PROCEDURE — 97112 NEUROMUSCULAR REEDUCATION: CPT | Mod: PN

## 2022-02-24 NOTE — PROGRESS NOTES
[facility]   Physical Therapy Treatment Note     Name: Huber Mahajan  Clinic Number: [id number]    Therapy Diagnosis:   Encounter Diagnoses   Name Primary?    Hip pain Yes    Generalized weakness     Decreased range of motion      Physician: Baldo Davis    Visit Date: 2/24/2022  Physician Orders: PT Eval and Treat   Medical Diagnosis from Referral: S73.191A (ICD-10-CM) - Tear of right acetabular labrum, initial encounter  Evaluation Date: 12/29/2021  Authorization Period Expiration: 12/31/21  Plan of Care Expiration: 3/29/22  Visit # / Visits authorized: 16/20 (1/1 eval)  PN DUE: 3/20/2022    Precautions: Standard        Right hip athroscopy with labral repair, femoroplasty, acetabuloplasty  Hip arthroscopy and labral repair protocol  eval and treat with modalities as needed        DOS: 12/20/21    Time In: 2:15 PM  Time Out: 3:00 PM  Total Billable Time: 45 minutes    SUBJECTIVE     Pt reports: he is doing well today and has no pain in his hip.     He was compliant with home exercise program.  Response to previous treatment: no notable change  Functional change: no notable change    Pain: 0/10  Location: non-specific, almost non-noticeable hip pain    OBJECTIVE     To be updated at next progress evaluation unless otherwise specified.        Treatment     Huber received the treatments listed below:      Therapeutic exercises to develop strength, endurance, ROM, flexibility and posture for 10 minutes, including:   - Upright bike - 5 minutes   - Kneeling hip flexor stretch - 2 minutes    - Prone resisted hip IR with knee bent - green band - 3x10   - Shuttle squats - single leg - 4.5 bands   - Sled push/pull with rebounder - 5 laps    Neuromuscular re-education to improve kinesthetic awareness and motor control for 25 minutes, including:   - Front Plank with hands on BOSU with tennis ball circles - verbal cueing and tactile cueing for maintenance of posterior  pelvic tilt - 1x1 minute   - Side Planks - B - verbal cueing and tactile cueing for maintenance of posterior pelvic tilt - 1x1 minute   - Wall clams - B - red band - 3x10   - Banded side steps - red band - 3 laps   - Single leg bridge with foot elevated on chair - 2x10   - BOSU Squats - 3x10   - R Single leg squats to 22 inches  - 3x10   - Single leg standing with knee bent with ball toss - 3x30s   - Step ups - onto chair - 3x10    Manual therapy for 10 minutes, including:   - Hip mobilizations to reduce pain and improve hip range of motion within restrictions    - inferior glides to improve pain free hip flexion    Patient Education and Home Exercises     Home Exercises Provided and Patient Education Provided     Education provided:   - importance of consistency with HEP    Written Home Exercises Provided: Patient instructed to cont prior HEP. Exercises were reviewed and Huber was able to demonstrate them prior to the end of the session.  Huber demonstrated good  understanding of the education provided. See EMR under Patient Instructions for exercises provided during therapy sessions    ASSESSMENT     Huber tolerated progressions well today. He continues to demonstrate improve neuromuscular control and strength. He will benefit from continued care.    Huber Is progressing well towards his goals.   Pt prognosis is Excellent.     Pt will continue to benefit from skilled outpatient physical therapy to address the deficits listed in the problem list box on initial evaluation, provide pt/family education and to maximize pt's level of independence in the home and community environment.     Pt's spiritual, cultural and educational needs considered and pt agreeable to plan of care and goals.     Anticipated barriers to physical therapy: none    GOALS: Short Term Goals:  6 weeks  1.Report decreased in pain at worst less than <   / =  2  /10  to increase tolerance for functional activities. (met, 2/18/22)  2. Pt to  improve R hip flexion range of motion to 90 to allow for improved functional mobility to allow for improvement in IADLs. (met)  3. Increased R hip abduction  MMT 1/2  to increase tolerance for ADL and work activities. (met, 2/18/22)  4. Pt to report ability to walk with no AD and no increased pain in R hip (met, 2/18/22)  5. Pt will be independent with HEP performance in order to continue PT progress at home. (2/18/22)        Long Term Goals:  8-12 weeks  1.Report decreased in pain at worst less than  <   / =  0  /10  to increase tolerance for functional mobility (progressing)  2.Pt will report ability to perform squat with no pain in R hip (met, 2/18/22)  3.Increased R hip flexion and extension MMT 1 grade  to increase tolerance for ADL and work activities. (met, 2/18/22)  4. Pt will report 20 % limitation or less on FOTO hip survey  to demonstrate increase in LE function with every day tasks. (progressing)  5. Pt to be Independent with progressed HEP (progressing)      PLAN   Continue to improve strength, neuromuscular control within parameters of protocol.    Plan of care Certification: 12/29/2021 to 3/29/22.       Tim Lee, PT

## 2022-03-02 ENCOUNTER — CLINICAL SUPPORT (OUTPATIENT)
Dept: REHABILITATION | Facility: HOSPITAL | Age: 22
End: 2022-03-02
Payer: COMMERCIAL

## 2022-03-02 DIAGNOSIS — R53.1 GENERALIZED WEAKNESS: ICD-10-CM

## 2022-03-02 DIAGNOSIS — M25.60 DECREASED RANGE OF MOTION: ICD-10-CM

## 2022-03-02 DIAGNOSIS — M25.559 HIP PAIN: Primary | ICD-10-CM

## 2022-03-02 PROCEDURE — 97140 MANUAL THERAPY 1/> REGIONS: CPT | Mod: PN

## 2022-03-02 PROCEDURE — 97112 NEUROMUSCULAR REEDUCATION: CPT | Mod: PN

## 2022-03-02 PROCEDURE — 97110 THERAPEUTIC EXERCISES: CPT | Mod: PN

## 2022-03-02 NOTE — PROGRESS NOTES
[facility]   Physical Therapy Treatment Note     Name: Huber Mahajan  Clinic Number: [id number]    Therapy Diagnosis:   Encounter Diagnoses   Name Primary?    Hip pain Yes    Generalized weakness     Decreased range of motion      Physician: Baldo Davis    Visit Date: 3/2/2022  Physician Orders: PT Eval and Treat   Medical Diagnosis from Referral: S73.191A (ICD-10-CM) - Tear of right acetabular labrum, initial encounter  Evaluation Date: 12/29/2021  Authorization Period Expiration: 12/31/21  Plan of Care Expiration: 3/29/22  Visit # / Visits authorized: 16/20 (1/1 eval)  PN DUE: 3/20/2022    Precautions: Standard        Right hip athroscopy with labral repair, femoroplasty, acetabuloplasty  Hip arthroscopy and labral repair protocol  eval and treat with modalities as needed        DOS: 12/20/21    Time In: 12:45 PM  Time Out: 1:32 PM  Total Billable Time: 47 minutes    SUBJECTIVE     Pt reports: Patient with no new complaints today. He reports his hip is feeling good today.    He was compliant with home exercise program.  Response to previous treatment: no notable change  Functional change: no notable change    Pain: 0/10  Location: non-specific, almost non-noticeable hip pain    OBJECTIVE     To be updated at next progress evaluation unless otherwise specified.        Treatment     Huber received the treatments listed below:      Therapeutic exercises to develop strength, endurance, ROM, flexibility and posture for 10 minutes, including:   - Upright bike - 5 minutes   - Kneeling hip flexor stretch - 2 minutes   - Supine legs raise to 6 inches with posterior pelvic tilt - 3xfatigue   - Prone resisted hip IR with knee bent - green band - 3x10   - Shuttle squats - single leg - 4.5 bands (not performed today)   - Sled push/pull with rebounder - 5 laps (not performed today)    Neuromuscular re-education to improve kinesthetic awareness and motor control for  25 minutes, including:   - Front Plank with hands on BOSU with tennis ball circles - verbal cueing and tactile cueing for maintenance of posterior pelvic tilt - 1x1 minute   - Side Planks - B - verbal cueing and tactile cueing for maintenance of posterior pelvic tilt - 1x1 minute   - Wall clams - B - green band - 3x10   - Banded side steps - green band - 3 laps   - Single leg bridge with foot elevated on chair - 2x10   - BOSU Squats - 3x10   - R Single leg squats to 22 inches  - 3x10   - Single leg standing with knee bent with ball toss - 3x30s   - Step ups - onto chair - 3x10    Manual therapy for 12 minutes, including:   - Hip mobilizations to reduce pain and improve hip range of motion within restrictions    - inferior glides to improve pain free hip flexion    Patient Education and Home Exercises     Home Exercises Provided and Patient Education Provided     Education provided:   - importance of consistency with HEP    Written Home Exercises Provided: Patient instructed to cont prior HEP. Exercises were reviewed and Huber was able to demonstrate them prior to the end of the session.  Huber demonstrated good  understanding of the education provided. See EMR under Patient Instructions for exercises provided during therapy sessions    ASSESSMENT     Good tolerance to increased core work today. Verbal cueing and tactile cueing for abdominal stabilization.  Some superficial hip popping during single leg squats today but non-painful.  He will benefit from continued physical therapy.  Huber Is progressing well towards his goals.   Pt prognosis is Excellent.     Pt will continue to benefit from skilled outpatient physical therapy to address the deficits listed in the problem list box on initial evaluation, provide pt/family education and to maximize pt's level of independence in the home and community environment.     Pt's spiritual, cultural and educational needs considered and pt agreeable to plan of care and  goals.     Anticipated barriers to physical therapy: none    GOALS: Short Term Goals:  6 weeks  1.Report decreased in pain at worst less than <   / =  2  /10  to increase tolerance for functional activities. (met, 2/18/22)  2. Pt to improve R hip flexion range of motion to 90 to allow for improved functional mobility to allow for improvement in IADLs. (met)  3. Increased R hip abduction  MMT 1/2  to increase tolerance for ADL and work activities. (met, 2/18/22)  4. Pt to report ability to walk with no AD and no increased pain in R hip (met, 2/18/22)  5. Pt will be independent with HEP performance in order to continue PT progress at home. (2/18/22)        Long Term Goals:  8-12 weeks  1.Report decreased in pain at worst less than  <   / =  0  /10  to increase tolerance for functional mobility (progressing)  2.Pt will report ability to perform squat with no pain in R hip (met, 2/18/22)  3.Increased R hip flexion and extension MMT 1 grade  to increase tolerance for ADL and work activities. (met, 2/18/22)  4. Pt will report 20 % limitation or less on FOTO hip survey  to demonstrate increase in LE function with every day tasks. (progressing)  5. Pt to be Independent with progressed HEP (progressing)      PLAN   Continue to improve strength, neuromuscular control within parameters of protocol.    Plan of care Certification: 12/29/2021 to 3/29/22.       Tim Lee, PT

## 2022-03-03 ENCOUNTER — CLINICAL SUPPORT (OUTPATIENT)
Dept: REHABILITATION | Facility: HOSPITAL | Age: 22
End: 2022-03-03
Payer: COMMERCIAL

## 2022-03-03 DIAGNOSIS — M25.559 HIP PAIN: Primary | ICD-10-CM

## 2022-03-03 DIAGNOSIS — M25.60 DECREASED RANGE OF MOTION: ICD-10-CM

## 2022-03-03 DIAGNOSIS — R53.1 GENERALIZED WEAKNESS: ICD-10-CM

## 2022-03-03 PROCEDURE — 97112 NEUROMUSCULAR REEDUCATION: CPT | Mod: PN

## 2022-03-03 PROCEDURE — 97110 THERAPEUTIC EXERCISES: CPT | Mod: PN

## 2022-03-03 PROCEDURE — 97140 MANUAL THERAPY 1/> REGIONS: CPT | Mod: PN

## 2022-03-03 NOTE — PROGRESS NOTES
[facility]   Physical Therapy Treatment Note     Name: Huber Mahaajn  Clinic Number: [id number]    Therapy Diagnosis:   Encounter Diagnoses   Name Primary?    Hip pain Yes    Generalized weakness     Decreased range of motion      Physician: Baldo Davis    Visit Date: 3/3/2022  Physician Orders: PT Eval and Treat   Medical Diagnosis from Referral: S73.191A (ICD-10-CM) - Tear of right acetabular labrum, initial encounter  Evaluation Date: 12/29/2021  Authorization Period Expiration: 12/31/21  Plan of Care Expiration: 3/29/22  Visit # / Visits authorized: 18/20 (1/1 eval)  PN DUE: 3/20/2022    Precautions: Standard        Right hip athroscopy with labral repair, femoroplasty, acetabuloplasty  Hip arthroscopy and labral repair protocol  eval and treat with modalities as needed        DOS: 12/20/21    Time In: 2:10 PM  Time Out: 2:55 PM  Total Billable Time: 45 minutes    SUBJECTIVE     Pt reports: Patient reports he has minor quadriceps soreness after yesterday but that he has no new complaints today. He reports his hip is feeling good.    He was compliant with home exercise program.  Response to previous treatment: no notable change  Functional change: no notable change    Pain: 0/10  Location: non-specific, almost non-noticeable hip pain    OBJECTIVE     To be updated at next progress evaluation unless otherwise specified.        Treatment     Huber received the treatments listed below:      Therapeutic exercises to develop strength, endurance, ROM, flexibility and posture for 10 minutes, including:   - Upright bike - 5 minutes   - Kneeling hip flexor stretch - 2 minutes   - Supine leg raise to 6 inches with posterior pelvic tilt - 3xfatigue   - Prone resisted hip IR with knee bent - green band - 3x10   - Shuttle squats - single le g - 4.5 bands   - Sled push/pull with rebounder - 5 laps (not performed today)    Neuromuscular re-education to improve kinesthetic awareness  and motor control for 25 minutes, including:   - Front Plank with hands on BOSU with tennis ball circles - verbal cueing and tactile cueing for maintenance of posterior pelvic tilt - 1x1 minute   - Side Planks - B - verbal cueing and tactile cueing for maintenance of posterior pelvic tilt - 1x1 minute   - Wall clams - B - green band - 3x10   - Banded side steps - green band - 3 laps   - Single leg bridge with foot elevated on chair - 2x10   - BOSU Squats - 3x10   - R Single leg squats to 22 inches  - 3x10   - Single leg standing with knee bent with ball toss - 3x30s   - Step ups - onto chair - 3x10    Manual therapy for 10 minutes, including:   - Hip mobilizations to reduce pain and improve hip range of motion within restrictions    - inferior glides to improve pain free hip flexion    Patient Education and Home Exercises     Home Exercises Provided and Patient Education Provided     Education provided:   - importance of consistency with HEP    Written Home Exercises Provided: Patient instructed to cont prior HEP. Exercises were reviewed and Huebr was able to demonstrate them prior to the end of the session.  Huber demonstrated good  understanding of the education provided. See EMR under Patient Instructions for exercises provided during therapy sessions    ASSESSMENT     Patient again displays improved neuromuscular control, most notably with single leg activities. He will benefit from continued care.  Huber Is progressing well towards his goals.   Pt prognosis is Excellent.     Pt will continue to benefit from skilled outpatient physical therapy to address the deficits listed in the problem list box on initial evaluation, provide pt/family education and to maximize pt's level of independence in the home and community environment.     Pt's spiritual, cultural and educational needs considered and pt agreeable to plan of care and goals.     Anticipated barriers to physical therapy: none    GOALS: Short Term Goals:   6 weeks  1.Report decreased in pain at worst less than <   / =  2  /10  to increase tolerance for functional activities. (met, 2/18/22)  2. Pt to improve R hip flexion range of motion to 90 to allow for improved functional mobility to allow for improvement in IADLs. (met)  3. Increased R hip abduction  MMT 1/2  to increase tolerance for ADL and work activities. (met, 2/18/22)  4. Pt to report ability to walk with no AD and no increased pain in R hip (met, 2/18/22)  5. Pt will be independent with HEP performance in order to continue PT progress at home. (2/18/22)        Long Term Goals:  8-12 weeks  1.Report decreased in pain at worst less than  <   / =  0  /10  to increase tolerance for functional mobility (progressing)  2.Pt will report ability to perform squat with no pain in R hip (met, 2/18/22)  3.Increased R hip flexion and extension MMT 1 grade  to increase tolerance for ADL and work activities. (met, 2/18/22)  4. Pt will report 20 % limitation or less on FOTO hip survey  to demonstrate increase in LE function with every day tasks. (progressing)  5. Pt to be Independent with progressed HEP (progressing)      PLAN   Continue to improve strength, neuromuscular control within parameters of protocol.    Plan of care Certification: 12/29/2021 to 3/29/22.       Tim Lee, PT

## 2022-03-08 ENCOUNTER — CLINICAL SUPPORT (OUTPATIENT)
Dept: REHABILITATION | Facility: HOSPITAL | Age: 22
End: 2022-03-08
Payer: COMMERCIAL

## 2022-03-08 DIAGNOSIS — M25.60 DECREASED RANGE OF MOTION: ICD-10-CM

## 2022-03-08 DIAGNOSIS — M25.559 HIP PAIN: Primary | ICD-10-CM

## 2022-03-08 DIAGNOSIS — R53.1 GENERALIZED WEAKNESS: ICD-10-CM

## 2022-03-08 PROCEDURE — 97110 THERAPEUTIC EXERCISES: CPT | Mod: PN

## 2022-03-08 PROCEDURE — 97112 NEUROMUSCULAR REEDUCATION: CPT | Mod: PN

## 2022-03-08 PROCEDURE — 97140 MANUAL THERAPY 1/> REGIONS: CPT | Mod: PN

## 2022-03-08 NOTE — PROGRESS NOTES
[facility]   Physical Therapy Treatment Note     Name: Huber Mahajan  Clinic Number: [id number]    Therapy Diagnosis:   Encounter Diagnoses   Name Primary?    Hip pain Yes    Generalized weakness     Decreased range of motion      Physician: Baldo Davis    Visit Date: 3/8/2022  Physician Orders: PT Eval and Treat   Medical Diagnosis from Referral: S73.191A (ICD-10-CM) - Tear of right acetabular labrum, initial encounter  Evaluation Date: 12/29/2021  Authorization Period Expiration: 12/31/21  Plan of Care Expiration: 3/29/22  Visit # / Visits authorized: 19/20 (1/1 eval)  PN DUE: 3/20/2022    Precautions: Standard        Right hip athroscopy with labral repair, femoroplasty, acetabuloplasty  Hip arthroscopy and labral repair protocol  eval and treat with modalities as needed        DOS: 12/20/21    Time In: 2:14  Time Out: 3:00  Total Billable Time: 46    SUBJECTIVE     Pt reports: Patient reports his hip is feeling good today and that he has not been having any pain.    He was compliant with home exercise program.  Response to previous treatment: no notable change  Functional change: no notable change    Pain: 0/10  Location: non-specific, almost non-noticeable hip pain    OBJECTIVE     To be updated at next progress evaluation unless otherwise specified.     59.1 lbs - seated hip internal rotation strength      Treatment     Huber received the treatments listed below:      Therapeutic exercises to develop strength, endurance, ROM, flexibility and posture for 14 minutes, including:   - Upright bike - 5 minutes   - Kneeling hip flexor stretch - 2 minutes (not performed today)   - Supine leg raise to 6 inches with posterior pelvic tilt - 3xfatigue (not performed today)   - Prone resisted hip IR with knee bent - green band - 3x10 (not performed today)   - Shuttle squats - single leg - 5.0 bands   - Agility Ladder - fwd 1 leg, fwd 2 leg, icky shuffle - 2 laps each at 50%  effort   - Sled push/pull with rebounder - 3 laps ; 57 lbs      Neuromuscular re-education to improve kinesthetic awareness and motor control for 23 minutes, including:   - Front Plank with hands on BOSU with tennis ball circles - verbal cueing and tactile cueing for maintenance of posterior pelvic tilt - 1x1 minute   - Side Planks - B - verbal cueing and tactile cueing for maintenance of posterior pelvic tilt - 1x1 minute   - Wall clams - B - green band - 3x10 (not performed today)   - Banded side steps, skiers, and monster walks - green band - 3 laps   - Single leg bridge with foot elevated on chair - 2x10   - BOSU Squats - 3x10   - R Single leg squats to 20 inches  - 3x10   - Single leg standing with knee bent with ball toss - 3x30s (not performed today)   - Step ups - onto chair - 3x10    Manual therapy for 9 minutes, including:   - Hip mobilizations to reduce pain and improve hip range of motion within restrictions    - inferior glides to improve pain free hip flexion    Patient Education and Home Exercises     Home Exercises Provided and Patient Education Provided     Education provided:   - importance of consistency with HEP    Written Home Exercises Provided: Patient instructed to cont prior HEP. Exercises were reviewed and Huber was able to demonstrate them prior to the end of the session.  Huber demonstrated good  understanding of the education provided. See EMR under Patient Instructions for exercises provided during therapy sessions    ASSESSMENT     Patient with very good progress.  IR strength along with previous strength measures as well as time-based goals indicate that patient is ready for transition in to next phase (phase 4) of protocol.  He will benefit from continued physical therapy care.   Pt prognosis is Excellent.     Pt will continue to benefit from skilled outpatient physical therapy to address the deficits listed in the problem list box on initial evaluation, provide pt/family education  and to maximize pt's level of independence in the home and community environment.     Pt's spiritual, cultural and educational needs considered and pt agreeable to plan of care and goals.     Anticipated barriers to physical therapy: none    GOALS: Short Term Goals:  6 weeks  1.Report decreased in pain at worst less than <   / =  2  /10  to increase tolerance for functional activities. (met, 2/18/22)  2. Pt to improve R hip flexion range of motion to 90 to allow for improved functional mobility to allow for improvement in IADLs. (met)  3. Increased R hip abduction  MMT 1/2  to increase tolerance for ADL and work activities. (met, 2/18/22)  4. Pt to report ability to walk with no AD and no increased pain in R hip (met, 2/18/22)  5. Pt will be independent with HEP performance in order to continue PT progress at home. (2/18/22)        Long Term Goals:  8-12 weeks  1.Report decreased in pain at worst less than  <   / =  0  /10  to increase tolerance for functional mobility (progressing)  2.Pt will report ability to perform squat with no pain in R hip (met, 2/18/22)  3.Increased R hip flexion and extension MMT 1 grade  to increase tolerance for ADL and work activities. (met, 2/18/22)  4. Pt will report 20 % limitation or less on FOTO hip survey  to demonstrate increase in LE function with every day tasks. (progressing)  5. Pt to be Independent with progressed HEP (progressing)      PLAN   Continue to improve strength, neuromuscular control within parameters of protocol.    Plan of care Certification: 12/29/2021 to 3/29/22.       Tim Lee, PT

## 2022-03-10 ENCOUNTER — CLINICAL SUPPORT (OUTPATIENT)
Dept: REHABILITATION | Facility: HOSPITAL | Age: 22
End: 2022-03-10
Payer: COMMERCIAL

## 2022-03-10 DIAGNOSIS — R53.1 GENERALIZED WEAKNESS: ICD-10-CM

## 2022-03-10 DIAGNOSIS — M25.559 HIP PAIN: Primary | ICD-10-CM

## 2022-03-10 DIAGNOSIS — M25.60 DECREASED RANGE OF MOTION: ICD-10-CM

## 2022-03-10 PROCEDURE — 97140 MANUAL THERAPY 1/> REGIONS: CPT | Mod: PN

## 2022-03-10 PROCEDURE — 97110 THERAPEUTIC EXERCISES: CPT | Mod: PN

## 2022-03-10 PROCEDURE — 97112 NEUROMUSCULAR REEDUCATION: CPT | Mod: PN

## 2022-03-10 NOTE — PROGRESS NOTES
[facility]   Physical Therapy Treatment Note     Name: Huber Mahajan  Clinic Number: [id number]    Therapy Diagnosis:   Encounter Diagnoses   Name Primary?    Hip pain Yes    Generalized weakness     Decreased range of motion      Physician: Baldo Davis    Visit Date: 3/10/2022  Physician Orders: PT Eval and Treat   Medical Diagnosis from Referral: S73.191A (ICD-10-CM) - Tear of right acetabular labrum, initial encounter  Evaluation Date: 12/29/2021  Authorization Period Expiration: 12/31/21  Plan of Care Expiration: 3/29/22  Visit # / Visits authorized: 20/20 (1/1 eval)  PN DUE: 3/20/2022    Precautions: Standard        Right hip athroscopy with labral repair, femoroplasty, acetabuloplasty  Hip arthroscopy and labral repair protocol  eval and treat with modalities as needed        DOS: 12/20/21    Time In: 2:18 PM   Time Out: 3:00  PM   Total Billable Time:42    SUBJECTIVE     Pt reports: Patient reports after last visit that he had muscle soreness. He reports his hip is feeling good today.    He was compliant with home exercise program.  Response to previous treatment: Muscle soreness   Functional change: no notable change    Pain: 0/10  Location: non-specific, almost non-noticeable hip pain    OBJECTIVE     To be updated at next progress evaluation unless otherwise specified.      Treatment     Huber received the treatments listed below:      Therapeutic exercises to develop strength, endurance, ROM, flexibility and posture for 24 minutes, including:   - Upright bike - 5 minutes   - Kneeling hip flexor stretch - 2 minutes    - Supine leg raise to 6 inches with posterior pelvic tilt - 3xfatigue (not performed today)   - Prone resisted hip IR with knee bent - green band - 3x10 (not performed today)   - Shuttle squats - single leg - 5.0 bands   - Agility Ladder - fwd 1 leg, fwd 2 leg, icky shuffle - 2 laps each at 50% effort   - Sled push/pull with rebounder - 3 laps ;  57 lbs   - single leg wall sits -B- 30 seconds          Neuromuscular re-education to improve kinesthetic awareness and motor control for 10 minutes, including:   - Front Plank with hands on BOSU with tennis ball circles - verbal cueing and tactile cueing for maintenance of posterior pelvic tilt - 1x1 minute   - Side Planks - B - verbal cueing and tactile cueing for maintenance of posterior pelvic tilt - 1x1 minute   - Wall clams - B - green band - 3x10 (not performed today)   - Banded side steps, skiers, and monster walks - green band - 3 laps   - Single leg bridge with foot elevated on chair - 2x10( not performed today)   - BOSU Squats - 3x10 (not performed today)   - R Single leg squats to 20 inches  - 3x10   - Single leg standing with knee bent with ball toss - 3x30s (not performed today)   - Step ups - onto chair - 3x10 ( not performed today)    Manual therapy for  8 minutes, including:   - Hip mobilizations to reduce pain and improve hip range of motion within restrictions    - inferior glides to improve pain free hip flexion    Patient Education and Home Exercises     Home Exercises Provided and Patient Education Provided     Education provided:   - importance of consistency with HEP    Written Home Exercises Provided: Patient instructed to cont prior HEP. Exercises were reviewed and Huber was able to demonstrate them prior to the end of the session.  Huber demonstrated good  understanding of the education provided. See EMR under Patient Instructions for exercises provided during therapy sessions    ASSESSMENT   Patient experienced moderate fatigue today with single leg activities. He also reports that he was not feeling his best today but denied any hip pain. He would benefit from continued physical therapy care.   Pt prognosis is Excellent.     Pt will continue to benefit from skilled outpatient physical therapy to address the deficits listed in the problem list box on initial evaluation, provide  pt/family education and to maximize pt's level of independence in the home and community environment.     Pt's spiritual, cultural and educational needs considered and pt agreeable to plan of care and goals.     Anticipated barriers to physical therapy: none    GOALS: Short Term Goals:  6 weeks  1.Report decreased in pain at worst less than <   / =  2  /10  to increase tolerance for functional activities. (met, 2/18/22)  2. Pt to improve R hip flexion range of motion to 90 to allow for improved functional mobility to allow for improvement in IADLs. (met)  3. Increased R hip abduction  MMT 1/2  to increase tolerance for ADL and work activities. (met, 2/18/22)  4. Pt to report ability to walk with no AD and no increased pain in R hip (met, 2/18/22)  5. Pt will be independent with HEP performance in order to continue PT progress at home. (2/18/22)        Long Term Goals:  8-12 weeks  1.Report decreased in pain at worst less than  <   / =  0  /10  to increase tolerance for functional mobility (progressing)  2.Pt will report ability to perform squat with no pain in R hip (met, 2/18/22)  3.Increased R hip flexion and extension MMT 1 grade  to increase tolerance for ADL and work activities. (met, 2/18/22)  4. Pt will report 20 % limitation or less on FOTO hip survey  to demonstrate increase in LE function with every day tasks. (progressing)  5. Pt to be Independent with progressed HEP (progressing)      PLAN   Continue to improve strength, neuromuscular control within parameters of protocol.    Plan of care Certification: 12/29/2021 to 3/29/22.       Tim Lee, PT

## 2022-03-15 ENCOUNTER — CLINICAL SUPPORT (OUTPATIENT)
Dept: REHABILITATION | Facility: HOSPITAL | Age: 22
End: 2022-03-15
Payer: COMMERCIAL

## 2022-03-15 DIAGNOSIS — M25.559 HIP PAIN: Primary | ICD-10-CM

## 2022-03-15 DIAGNOSIS — R53.1 GENERALIZED WEAKNESS: ICD-10-CM

## 2022-03-15 DIAGNOSIS — M25.60 DECREASED RANGE OF MOTION: ICD-10-CM

## 2022-03-15 PROCEDURE — 97112 NEUROMUSCULAR REEDUCATION: CPT | Mod: PN

## 2022-03-15 PROCEDURE — 97110 THERAPEUTIC EXERCISES: CPT | Mod: PN

## 2022-03-15 PROCEDURE — 97140 MANUAL THERAPY 1/> REGIONS: CPT | Mod: PN

## 2022-03-15 NOTE — PROGRESS NOTES
[facility]   Physical Therapy Treatment Note     Name: Huber Mahajan  Clinic Number: [id number]    Therapy Diagnosis:   Encounter Diagnoses   Name Primary?    Hip pain Yes    Generalized weakness     Decreased range of motion      Physician: Baldo Davis    Visit Date: 3/15/2022  Physician Orders: PT Eval and Treat   Medical Diagnosis from Referral: S73.191A (ICD-10-CM) - Tear of right acetabular labrum, initial encounter  Evaluation Date: 12/29/2021  Authorization Period Expiration: 12/31/21  Plan of Care Expiration: 3/29/22  Visit # / Visits authorized: 21/35 (1/1 eval)  PN DUE: 3/20/2022    Precautions: Standard        Right hip athroscopy with labral repair, femoroplasty, acetabuloplasty  Hip arthroscopy and labral repair protocol  eval and treat with modalities as needed        DOS: 12/20/21    Time In: 2:15 PM   Time Out: 3:00PM   Total Billable Time:45 minutes     SUBJECTIVE     Pt reports: Patient reports he has had more soreness in the front of his hip since his last physical therapy visit.  He reports today is the most it has bothered him in a while. He reports it feels achey in the front of his hip after sitting in class for a while but then starts to feel better once he gets up and walking.    He was compliant with home exercise program.  Response to previous treatment: Muscle soreness   Functional change: no notable change    Pain: 3/10   Location: non-specific, almost non-noticeable hip pain    OBJECTIVE     To be updated at next progress evaluation unless otherwise specified.      Treatment     Huber received the treatments listed below:      Therapeutic exercises to develop strength, endurance, ROM, flexibility and posture for 11 minutes, including:   - Upright bike - 5 minutes   - Kneeling hip flexor stretch - 2 minutes    - Supine leg raise to 6 inches with posterior pelvic tilt - 3xfatigue (not performed today)   - Prone resisted hip IR with knee bent  - green band - 3x10 (not performed today)   - Shuttle squats - single leg - 5.0 bands (not performed today)   - Agility Ladder - fwd 1 leg, fwd 2 leg, icky shuffle - 2 laps each at 50% effort (not performed today)    - Single leg wall sits -B- 30 seconds ( not performed today)    - Supine hip flexor stretch in Mitchell test position- 4 minutes- Red theraband       Neuromuscular re-education to improve kinesthetic awareness and motor control for 9  minutes, including:   - Front Plank with hands on BOSU with tennis ball circles - verbal cueing and tactile cueing for maintenance of posterior pelvic tilt - 1x1 minute (not performed today)   - Side Planks - B - verbal cueing and tactile cueing for maintenance of posterior pelvic tilt - 1x1 minute ( not performed today)    - Wall clams - B - green band - 3x10 (not performed today)   - Banded side steps, skiers, and monster walks - green band - 3 laps ( not performed today)    - Single leg bridge with foot elevated on chair - 2x10( not performed today)   - BOSU Squats - 3x10 (not performed today)   - R Single leg squats to 20 inches  - 3x10 ( not performed today)    - Single leg standing with knee bent with ball toss - 3x30s (not performed today)   - Step ups - onto chair - 3x10 ( not performed today)    Manual therapy for  35  minutes, including:   - Hip mobilizations to reduce pain and improve hip range of motion within restrictions    - inferior glides to improve pain free ROM    - lateral glides to improve pain-free ROM    - R Hip low grade distraction    - Light IASTM to R hip flexor in supine - to reduce pain     Patient Education and Home Exercises     Home Exercises Provided and Patient Education Provided     Education provided:   - importance of consistency with HEP    Written Home Exercises Provided: Patient instructed to cont prior HEP. Exercises were reviewed and Huber was able to demonstrate them prior to the end of the session.  Huber demonstrated good   understanding of the education provided. See EMR under Patient Instructions for exercises provided during therapy sessions    ASSESSMENT   Treatment was modified today for pain relief after patients complaint of anterior hip pain. His pain is consistent with hip flexor tendinitis. Gave him instructions to make sure he is consistent with hip flexor stretches. He would benefit from continued physical therapy care.     Pt prognosis is Excellent.     Pt will continue to benefit from skilled outpatient physical therapy to address the deficits listed in the problem list box on initial evaluation, provide pt/family education and to maximize pt's level of independence in the home and community environment.     Pt's spiritual, cultural and educational needs considered and pt agreeable to plan of care and goals.     Anticipated barriers to physical therapy: none    GOALS: Short Term Goals:  6 weeks  1.Report decreased in pain at worst less than <   / =  2  /10  to increase tolerance for functional activities. (met, 2/18/22)  2. Pt to improve R hip flexion range of motion to 90 to allow for improved functional mobility to allow for improvement in IADLs. (met)  3. Increased R hip abduction  MMT 1/2  to increase tolerance for ADL and work activities. (met, 2/18/22)  4. Pt to report ability to walk with no AD and no increased pain in R hip (met, 2/18/22)  5. Pt will be independent with HEP performance in order to continue PT progress at home. (2/18/22)        Long Term Goals:  8-12 weeks  1.Report decreased in pain at worst less than  <   / =  0  /10  to increase tolerance for functional mobility (progressing)  2.Pt will report ability to perform squat with no pain in R hip (met, 2/18/22)  3.Increased R hip flexion and extension MMT 1 grade  to increase tolerance for ADL and work activities. (met, 2/18/22)  4. Pt will report 20 % limitation or less on FOTO hip survey  to demonstrate increase in LE function with every day tasks.  (progressing)  5. Pt to be Independent with progressed HEP (progressing)      PLAN   Continue to improve strength, neuromuscular control within parameters of protocol.    Plan of care Certification: 12/29/2021 to 3/29/22.       Tim Lee, PT

## 2022-03-17 ENCOUNTER — CLINICAL SUPPORT (OUTPATIENT)
Dept: REHABILITATION | Facility: HOSPITAL | Age: 22
End: 2022-03-17
Payer: COMMERCIAL

## 2022-03-17 DIAGNOSIS — M25.60 DECREASED RANGE OF MOTION: ICD-10-CM

## 2022-03-17 DIAGNOSIS — R53.1 GENERALIZED WEAKNESS: ICD-10-CM

## 2022-03-17 DIAGNOSIS — M25.559 HIP PAIN: Primary | ICD-10-CM

## 2022-03-17 PROCEDURE — 97110 THERAPEUTIC EXERCISES: CPT | Mod: PN

## 2022-03-17 PROCEDURE — 97140 MANUAL THERAPY 1/> REGIONS: CPT | Mod: PN

## 2022-03-17 PROCEDURE — 97112 NEUROMUSCULAR REEDUCATION: CPT | Mod: PN

## 2022-03-17 NOTE — PROGRESS NOTES
[facility]   Physical Therapy Treatment Note     Name: Huber Mahajan  Clinic Number: [id number]    Therapy Diagnosis:   Encounter Diagnoses   Name Primary?    Hip pain Yes    Generalized weakness     Decreased range of motion      Physician: Baldo Davis    Visit Date: 3/17/2022  Physician Orders: PT Eval and Treat   Medical Diagnosis from Referral: S73.191A (ICD-10-CM) - Tear of right acetabular labrum, initial encounter  Evaluation Date: 12/29/2021  Authorization Period Expiration: 12/31/21  Plan of Care Expiration: 3/29/22  Visit # / Visits authorized: 22/35 (1/1 eval)  PN DUE: 3/20/2022    Precautions: Standard        Right hip athroscopy with labral repair, femoroplasty, acetabuloplasty  Hip arthroscopy and labral repair protocol  eval and treat with modalities as needed        DOS: 12/20/21    Time In: 2:17PM   Time Out:3:02 PM   Total Billable Time:45 minutes     SUBJECTIVE     Pt reports: Patient reports his R hip pain resolved after last visit. He reports he is feeling good today and having no hip pain.    He was compliant with home exercise program.  Response to previous treatment: Muscle soreness   Functional change: no notable change    Pain: 1/10   Location: non-specific, almost non-noticeable hip pain    OBJECTIVE     To be updated at next progress evaluation unless otherwise specified.      Treatment     Huber received the treatments listed below:      Therapeutic exercises to develop strength, endurance, ROM, flexibility and posture for 11 minutes, including:   - Upright bike - 5 minutes   - Kneeling hip flexor stretch - 2 minutes    - Supine leg raise to 6 inches with posterior pelvic tilt - 3x30 seconds   - Shuttle squats - single leg - 5.0 bands (not performed today)   - Agility Ladder - fwd 1 leg, fwd 2 leg, icky shuffle - 2 laps each at 50% effort    - Single leg wall sits -B- 30 seconds    - Supine hip flexor stretch in Mitchell test position- 4  minutes- Red theraband( not performed today)       Neuromuscular re-education to improve kinesthetic awareness and motor control for 24  minutes, including:   - Front Plank with hands on BOSU with tennis ball circles - verbal cueing and tactile cueing for maintenance of posterior pelvic tilt - 1x1 minute   - Side Planks - B - verbal cueing and tactile cueing for maintenance of posterior pelvic tilt - 1x1 minute (not performed today)   - Wall clams - B - green band - 3x10  (not performed today)   - Banded side steps, skiers, and monster walks - green band - 3 laps    - Single leg bridge with foot elevated on chair - 2x10( not performed today)   - BOSU  Single leg squats - 3x10    - R Single leg squats to 20 inches  - 3x10  (not performed today)    - Single leg standing with knee bent with ball toss - 3x30s (not performed today)   - Step ups - onto chair - 3x10    - Pre jump training- free motion- 15#-2x10    Manual therapy for  10  minutes, including:   - Hip mobilizations to reduce pain and improve hip range of motion within restrictions    - inferior glides to improve pain free ROM    - lateral glides to improve pain-free ROM    - R Hip low grade distraction    - Light IASTM to R hip flexor in supine - to reduce pain (not performed today)     Patient Education and Home Exercises     Home Exercises Provided and Patient Education Provided     Education provided:   - importance of consistency with HEP    Written Home Exercises Provided: Patient instructed to cont prior HEP. Exercises were reviewed and Huber was able to demonstrate them prior to the end of the session.  Huber demonstrated good  understanding of the education provided. See EMR under Patient Instructions for exercises provided during therapy sessions    ASSESSMENT   Patient hip flexor pain from last visit is alleviated.  He was able to resume his exercise regimen without pain or difficulty today.  He will benefit from continued care.    Pt prognosis is  Excellent.     Pt will continue to benefit from skilled outpatient physical therapy to address the deficits listed in the problem list box on initial evaluation, provide pt/family education and to maximize pt's level of independence in the home and community environment.     Pt's spiritual, cultural and educational needs considered and pt agreeable to plan of care and goals.     Anticipated barriers to physical therapy: none    GOALS: Short Term Goals:  6 weeks  1.Report decreased in pain at worst less than <   / =  2  /10  to increase tolerance for functional activities. (met, 2/18/22)  2. Pt to improve R hip flexion range of motion to 90 to allow for improved functional mobility to allow for improvement in IADLs. (met)  3. Increased R hip abduction  MMT 1/2  to increase tolerance for ADL and work activities. (met, 2/18/22)  4. Pt to report ability to walk with no AD and no increased pain in R hip (met, 2/18/22)  5. Pt will be independent with HEP performance in order to continue PT progress at home. (2/18/22)        Long Term Goals:  8-12 weeks  1.Report decreased in pain at worst less than  <   / =  0  /10  to increase tolerance for functional mobility (progressing)  2.Pt will report ability to perform squat with no pain in R hip (met, 2/18/22)  3.Increased R hip flexion and extension MMT 1 grade  to increase tolerance for ADL and work activities. (met, 2/18/22)  4. Pt will report 20 % limitation or less on FOTO hip survey  to demonstrate increase in LE function with every day tasks. (progressing)  5. Pt to be Independent with progressed HEP (progressing)      PLAN   Continue to improve strength, neuromuscular control within parameters of protocol.    Plan of care Certification: 12/29/2021 to 3/29/22.       Tim Lee, PT

## 2022-03-22 ENCOUNTER — CLINICAL SUPPORT (OUTPATIENT)
Dept: REHABILITATION | Facility: HOSPITAL | Age: 22
End: 2022-03-22
Payer: COMMERCIAL

## 2022-03-22 DIAGNOSIS — R53.1 GENERALIZED WEAKNESS: ICD-10-CM

## 2022-03-22 DIAGNOSIS — M25.60 DECREASED RANGE OF MOTION: ICD-10-CM

## 2022-03-22 DIAGNOSIS — M25.559 HIP PAIN: Primary | ICD-10-CM

## 2022-03-22 PROCEDURE — 97110 THERAPEUTIC EXERCISES: CPT | Mod: PN

## 2022-03-22 PROCEDURE — 97140 MANUAL THERAPY 1/> REGIONS: CPT | Mod: PN

## 2022-03-22 NOTE — PLAN OF CARE
[facility]   Physical Therapy Treatment Note     Name: Huber Mahajan  [facility] Number: [id number]    Therapy Diagnosis:   Encounter Diagnoses   Name Primary?    Hip pain Yes    Generalized weakness     Decreased range of motion      Physician: Baldo Davis    Visit Date: 3/22/2022  Physician Orders: PT Eval and Treat   Medical Diagnosis from Referral: S73.191A (ICD-10-CM) - Tear of right acetabular labrum, initial encounter  Evaluation Date: 2021  Authorization Period Expiration: 21  Plan of Care Expiration: 5/3/2022  Visit # / Visits authorized:  ( eval)  PN DUE: 22    Precautions: Standard        Right hip athroscopy with labral repair, femoroplasty, acetabuloplasty  Hip arthroscopy and labral repair protocol  eval and treat with modalities as needed        DOS: 21    Time In: 9:55 PM   Time Out 10:50 PM   Total Billable Time: 55 minutes     SUBJECTIVE     Pt reports: He has been mostly pain-free at his hip.  He has not been having any of the pain he was having pre-operatively. He reports he feels he is making steady progress.    He was compliant with home exercise program.  Response to previous treatment: Muscle soreness   Functional change: no notable change    Pain: 1/10   Location: non-specific, almost non-noticeable hip pain    OBJECTIVE       Sensation: Light touch: intact to light touch     Gait: Good gait pattern with no pain     Hip Range of Motion:   Left Right   Internal rotation  90 45 degrees 42 degrees   External rotation   90 55 degrees 56 degrees   Flexion   120 degrees 120 degrees   Abduction  57 degrees 60 degrees        Lower Extremity Strength    Left Right   Knee extension: 71 69.1   Knee flexion: 66.7 66.8   Hip flexion in sittin 83.2   Hip Internal Rotation prone:  54.6    60      Hip External Rotation prone: 55.9    48.2      Hip extension in prone:  40.6 35.3   Hip abduction in sidelyin.6 26.3   Hip  adduction in hooklyin 50.2     Flexibility: R hip flexor moderate restriction ; R hamstring mild restriction        Joint Mobility: mild hypomobility to PA hip mobs       Functional Tests:    Left Right Difference   Y Balance:      Posteriomedial 84.5 cm 75 cm 9.5 cm   Posterolateral 87 cm 83 cm 4 cm   Anterior 48 cm 46 cm 2 cm         6 inch 30 second Step-down test 27 reps 23 reps 4 reps                    CMS Impairment/Limitation/Restriction for FOTO Hip Survey     Therapist reviewed FOTO scores for Huber Mahajan on 3/22/22.   FOTO documents entered into [employer] - see Media section.     Limitation Score: 20%           Treatment     Huber received the treatments listed below:      Therapeutic exercises to develop strength, endurance, ROM, flexibility and posture for 40 minutes, including:   - Upright bike - 5 minutes   - Kneeling hip flexor stretch - 2 minutes    - Shuttle squats - single leg - 5.0 bands    - Agility Ladder - fwd 1 leg, fwd 2 leg, icky shuffle - 2 laps each at 80% effort    - Single leg wall sits -B- 30 seconds    - Step ups - onto chair - 3x10        Neuromuscular re-education to improve kinesthetic awareness and motor control for 0  minutes, including:   - none performed today           Manual therapy for  15 minutes, including:  - hip passive range of motion   - resistive hip range of motion   - restive knee range of motion                Patient Education and Home Exercises     Home Exercises Provided and Patient Education Provided     Education provided:   - importance of consistency with HEP    Written Home Exercises Provided: Patient instructed to cont prior HEP. Exercises were reviewed and Huber was able to demonstrate them prior to the end of the session.  Huber demonstrated good  understanding of the education provided. See EMR under Patient Instructions for exercises provided during therapy sessions    ASSESSMENT     Patient with improvement in his subjective, objective, and  functional assessment measures since his last progress evaluation. He still has minor strength deficits at his R leg compared to his L.  He is prepared to enter into his final stage of protocol with gradual [id number] progression beginning next week.  I expect he will need 4-6 weeks more of formal physical therapy to return to his pre-injury status.       Pt prognosis is Excellent.     Pt will continue to benefit from skilled outpatient physical therapy to address the deficits listed in the problem list box on initial evaluation, provide pt/family education and to maximize pt's level of independence in the home and community environment.     Pt's spiritual, cultural and educational needs considered and pt agreeable to plan of care and goals.     Anticipated barriers to physical therapy: none    GOALS: Short Term Goals:  6 weeks  1.Report decreased in pain at worst less than <   / =  2  /10  to increase tolerance for functional activities. (met, 2/18/22)  2. Pt to improve R hip flexion range of motion to 90 to allow for improved functional mobility to allow for improvement in IADLs. (met)  3. Increased R hip abduction  MMT 1/2  to increase tolerance for ADL and work activities. (met, 2/18/22)  4. Pt to report ability to walk with no AD and no increased pain in R hip (met, 2/18/22)  5. Pt will be independent with HEP performance in order to continue PT progress at home. (met, 2/18/22)        Long Term Goals:  8-12 weeks  1.Report decreased in pain at worst less than  <   / =  0  /10  to increase tolerance for functional mobility (progressing)  2.Pt will report ability to perform squat with no pain in R hip (met, 2/18/22)  3.Increased R hip flexion and extension MMT 1 grade  to increase tolerance for ADL and work activities. (met, 2/18/22)  4. Pt will report 20 % limitation or less on FOTO hip survey  to demonstrate increase in LE function with every day tasks. (progressing)  5. Pt to be Independent with  progressed HEP (progressing)  6. Patient to be able to run/sprint pain-free.  7. Patient to be able to weight-lift pain-free.      PLAN   Continue to improve strength, neuromuscular control within parameters of protocol.    Plan of care Certification: 12/29/2021 to 5/3/22.     2x/week for 6 weeks to include therapeutic exercises, therapeutic activities, neuromuscular re-education, manual therapy.      Tim Lee, PT, DPT  Physical Therapist  Board-Certified Specialist in Orthopedic Physical Therapy  3/22/2022

## 2022-03-22 NOTE — PROGRESS NOTES
See Plan of care for progress evaluation.      Tmi Lee, PT, DPT  Physical Therapist  Board-Certified Specialist in Orthopedic Physical Therapy  3/22/2022

## 2022-03-24 ENCOUNTER — CLINICAL SUPPORT (OUTPATIENT)
Dept: REHABILITATION | Facility: HOSPITAL | Age: 22
End: 2022-03-24
Payer: COMMERCIAL

## 2022-03-24 DIAGNOSIS — M25.559 HIP PAIN: Primary | ICD-10-CM

## 2022-03-24 DIAGNOSIS — M25.60 DECREASED RANGE OF MOTION: ICD-10-CM

## 2022-03-24 DIAGNOSIS — R53.1 GENERALIZED WEAKNESS: ICD-10-CM

## 2022-03-24 PROCEDURE — 97110 THERAPEUTIC EXERCISES: CPT | Mod: PN

## 2022-03-24 PROCEDURE — 97140 MANUAL THERAPY 1/> REGIONS: CPT | Mod: PN

## 2022-03-24 NOTE — PROGRESS NOTES
[facility]   Physical Therapy Treatment Note     Name: Huber Mahajan  Clinic Number: [id number]    Therapy Diagnosis:   Encounter Diagnoses   Name Primary?    Hip pain Yes    Generalized weakness     Decreased range of motion      Physician: Baldo Davis    Visit Date: 3/24/2022  Physician Orders: PT Eval and Treat   Medical Diagnosis from Referral: S73.191A (ICD-10-CM) - Tear of right acetabular labrum, initial encounter  Evaluation Date: 12/29/2021  Authorization Period Expiration: 12/31/21  Plan of Care Expiration: 3/29/22  Visit # / Visits authorized: 22/35 (1/1 eval)  PN DUE: 3/20/2022    Precautions: Standard        Right hip athroscopy with labral repair, femoroplasty, acetabuloplasty  Hip arthroscopy and labral repair protocol  eval and treat with modalities as needed        DOS: 12/20/21    Time In: 2:17PM   Time Out:3:02 PM   Total Billable Time:45 minutes     SUBJECTIVE     Pt reports: Patient reports he is doing well today and is having no pain at his hip. He reports he has returned to the gym and is jogging on his own without pain. He is doing squats and deadlifts at the gym without pain.    He was compliant with home exercise program.  Response to previous treatment: Muscle soreness   Functional change: no notable change    Pain: 1/10   Location: non-specific, almost non-noticeable hip pain    OBJECTIVE     To be updated at next progress evaluation unless otherwise specified.      Treatment     Huber received the treatments listed below:      Therapeutic exercises to develop strength, endurance, ROM, flexibility and posture for 35 minutes, including:   - Upright bike - 5 minutes   - Treadmill run/walk- 1 minute run - 3 minute walk    - Broad jumps - 3 laps    - Step and Hods   - A & B skips - 2 laps     - Dynamic Warm Up - 10 minutes     - Leg swings at chair     -front/back x10     -side/side x10    - quadruped [employer]drants x 10      - quadruped Hip  circles fwd/bkw   - Treadmill - 5 minutes jog   - Bounding - 3 laps        Neuromuscular re-education to improve kinesthetic awareness and motor control for 10  minutes, including:   - none performed today    Manual therapy for  10  minutes, including:   - Hip mobilizations to reduce pain and improve hip range of motion within restrictions    - inferior glides to improve pain free ROM    - lateral glides to improve pain-free ROM    - R Hip low grade distraction     Possible for next time:    - educate patient on self hip mobilization.   Patient Education and Home Exercises     Home Exercises Provided and Patient Education Provided     Education provided:   - importance of consistency with HEP    Written Home Exercises Provided: Patient instructed to cont prior HEP. Exercises were reviewed and Huber was able to demonstrate them prior to the end of the session.  Huber demonstrated good  understanding of the education provided. See EMR under Patient Instructions for exercises provided during therapy sessions    ASSESSMENT   Patient continues to demonstrate good progress. He did have some anterior hip symptoms during the landing phase of broad jumps that was alleviated with manual joint mobilizations. He will benefit from continued physical therapy care.    Pt prognosis is Excellent.     Pt will continue to benefit from skilled outpatient physical therapy to address the deficits listed in the problem list box on initial evaluation, provide pt/family education and to maximize pt's level of independence in the home and community environment.     Pt's spiritual, cultural and educational needs considered and pt agreeable to plan of care and goals.     Anticipated barriers to physical therapy: none    GOALS: Short Term Goals:  6 weeks  1.Report decreased in pain at worst less than <   / =  2  /10  to increase tolerance for functional activities. (met, 2/18/22)  2. Pt to improve R hip flexion range of motion to 90 to allow  for improved functional mobility to allow for improvement in IADLs. (met)  3. Increased R hip abduction  MMT 1/2  to increase tolerance for ADL and work activities. (met, 2/18/22)  4. Pt to report ability to walk with no AD and no increased pain in R hip (met, 2/18/22)  5. Pt will be independent with HEP performance in order to continue PT progress at home. (2/18/22)        Long Term Goals:  8-12 weeks  1.Report decreased in pain at worst less than  <   / =  0  /10  to increase tolerance for functional mobility (progressing)  2.Pt will report ability to perform squat with no pain in R hip (met, 2/18/22)  3.Increased R hip flexion and extension MMT 1 grade  to increase tolerance for ADL and work activities. (met, 2/18/22)  4. Pt will report 20 % limitation or less on FOTO hip survey  to demonstrate increase in LE function with every day tasks. (progressing)  5. Pt to be Independent with progressed HEP (progressing)      PLAN   Continue to improve strength, neuromuscular control within parameters of protocol.    Plan of care Certification: 12/29/2021 to 3/29/22.       Tim Lee, PT

## 2022-03-29 ENCOUNTER — CLINICAL SUPPORT (OUTPATIENT)
Dept: REHABILITATION | Facility: HOSPITAL | Age: 22
End: 2022-03-29
Payer: COMMERCIAL

## 2022-03-29 DIAGNOSIS — M25.60 DECREASED RANGE OF MOTION: ICD-10-CM

## 2022-03-29 DIAGNOSIS — M25.559 HIP PAIN: Primary | ICD-10-CM

## 2022-03-29 DIAGNOSIS — R53.1 GENERALIZED WEAKNESS: ICD-10-CM

## 2022-03-29 PROCEDURE — 97110 THERAPEUTIC EXERCISES: CPT | Mod: PN

## 2022-03-29 NOTE — PROGRESS NOTES
[facility]   Physical Therapy Treatment Note     Name: Huber Mahajan  Clinic Number: [id number]    Therapy Diagnosis:   Encounter Diagnoses   Name Primary?    Hip pain Yes    Generalized weakness     Decreased range of motion      Physician: Baldo Davis    Visit Date: 3/29/2022  Physician Orders: PT Eval and Treat   Medical Diagnosis from Referral: S73.191A (ICD-10-CM) - Tear of right acetabular labrum, initial encounter  Evaluation Date: 12/29/2021  Authorization Period Expiration: 12/31/21  Plan of Care Expiration: 3/29/22  Visit # / Visits authorized: 25/35 (1/1 eval)  PN DUE: 4/21/2022    Precautions: Standard        Right hip athroscopy with labral repair, femoroplasty, acetabuloplasty  Hip arthroscopy and labral repair protocol  eval and treat with modalities as needed        DOS: 12/20/21    Time In: 2:15 PM   Time Out: 3:00 PM   Total Billable Time: 45 minutes     SUBJECTIVE     Pt reports: Patient reports he had a little bit of hip pain after last     He was compliant with home exercise program.  Response to previous treatment: Muscle soreness   Functional change: no notable change    Pain: 1/10   Location: non-specific, almost non-noticeable hip pain    OBJECTIVE     To be updated at next progress evaluation unless otherwise specified.      Treatment     Huber received the treatments listed below:      Therapeutic exercises to develop strength, endurance, ROM, flexibility and posture for 45 minutes, including:   - Treadmill jog- 5 minutes   - Broad jumps - 3 laps   - Lateral Jumps - 38 inches - 2x5    - Step and Hods   - A & B skips - 2 laps     - Dynamic Warm Up - 10 minutes     - Leg swings at chair B     -front/back 2x10     -side/side 2x10    - quadruped Fire Hydrants B 2x10      - quadruped Hip circles fwd/bkw B 2x10   - Bounding - 3 laps   - Speed Ladder - 100% effort - 2 laps each    - fwd 2 leg    - fwd 1 leg    - sideways    - backward    - icky  shuffle    - icky shuffle with pause    - joryca back    - quick side step B    - Ali Shuffle          Neuromuscular re-education to improve kinesthetic awareness and motor control for 0 minutes, including:   - none performed today    Manual therapy for 0  minutes, including:   - none performed today    Possible for next time:    - educate patient on self hip mobilization.   Patient Education and Home Exercises     Home Exercises Provided and Patient Education Provided     Education provided:   - importance of consistency with HEP    Written Home Exercises Provided: Patient instructed to cont prior HEP. Exercises were reviewed and Huber was able to demonstrate them prior to the end of the session.  Huber demonstrated good  understanding of the education provided. See EMR under Patient Instructions for exercises provided during therapy sessions    ASSESSMENT   Patient did well with increased strengthening and plyometric activity today. He is progressing well.    Pt prognosis is Excellent.     Pt will continue to benefit from skilled outpatient physical therapy to address the deficits listed in the problem list box on initial evaluation, provide pt/family education and to maximize pt's level of independence in the home and community environment.     Pt's spiritual, cultural and educational needs considered and pt agreeable to plan of care and goals.     Anticipated barriers to physical therapy: none    GOALS: Short Term Goals:  6 weeks  1.Report decreased in pain at worst less than <   / =  2  /10  to increase tolerance for functional activities. (met, 2/18/22)  2. Pt to improve R hip flexion range of motion to 90 to allow for improved functional mobility to allow for improvement in IADLs. (met)  3. Increased R hip abduction  MMT 1/2  to increase tolerance for ADL and work activities. (met, 2/18/22)  4. Pt to report ability to walk with no AD and no increased pain in R hip (met, 2/18/22)  5. Pt will be independent  with HEP performance in order to continue PT progress at home. (2/18/22)        Long Term Goals:  8-12 weeks  1.Report decreased in pain at worst less than  <   / =  0  /10  to increase tolerance for functional mobility (progressing)  2.Pt will report ability to perform squat with no pain in R hip (met, 2/18/22)  3.Increased R hip flexion and extension MMT 1 grade  to increase tolerance for ADL and work activities. (met, 2/18/22)  4. Pt will report 20 % limitation or less on FOTO hip survey  to demonstrate increase in LE function with every day tasks. (progressing)  5. Pt to be Independent with progressed HEP (progressing)      PLAN   Continue to improve strength, neuromuscular control within parameters of protocol.    Plan of care Certification: 12/29/2021 to 3/29/22.       Tim Lee, PT

## 2022-03-31 ENCOUNTER — CLINICAL SUPPORT (OUTPATIENT)
Dept: REHABILITATION | Facility: HOSPITAL | Age: 22
End: 2022-03-31
Payer: COMMERCIAL

## 2022-03-31 DIAGNOSIS — R53.1 GENERALIZED WEAKNESS: ICD-10-CM

## 2022-03-31 DIAGNOSIS — M25.60 DECREASED RANGE OF MOTION: ICD-10-CM

## 2022-03-31 DIAGNOSIS — M25.559 HIP PAIN: Primary | ICD-10-CM

## 2022-03-31 PROCEDURE — 97110 THERAPEUTIC EXERCISES: CPT | Mod: PN

## 2022-03-31 PROCEDURE — 97140 MANUAL THERAPY 1/> REGIONS: CPT | Mod: PN

## 2022-03-31 NOTE — PROGRESS NOTES
[facility]   Physical Therapy Treatment Note     Name: Huber Mahajan  Clinic Number: [id number]    Therapy Diagnosis:   Encounter Diagnoses   Name Primary?    Hip pain Yes    Generalized weakness     Decreased range of motion      Physician: Baldo Davis    Visit Date: 3/31/2022  Physician Orders: PT Eval and Treat   Medical Diagnosis from Referral: S73.191A (ICD-10-CM) - Tear of right acetabular labrum, initial encounter  Evaluation Date: 12/29/2021  Authorization Period Expiration: 12/31/21  Plan of Care Expiration: 3/29/22  Visit # / Visits authorized: 26/35 (1/1 eval)  PN DUE: 4/21/2022    Precautions: Standard        Right hip athroscopy with labral repair, femoroplasty, acetabuloplasty  Hip arthroscopy and labral repair protocol  eval and treat with modalities as needed        DOS: 12/20/21    Time In: 2:05 PM   Time Out: 3:00 PM   Total Billable Time: 55 minutes     SUBJECTIVE     Pt reports: Patient reports he had a small flare up of hip pain after last visit as well as some muscular soreness. But her reports he is doing well overall today.    He was compliant with home exercise program.  Response to previous treatment: Muscle soreness  ; minor hip joint soreness  Functional change: no notable change    Pain: 3/10   Location: non-specific, almost non-noticeable hip pain    OBJECTIVE     To be updated at next progress evaluation unless otherwise specified.      Treatment     Huber received the treatments listed below:      Therapeutic exercises to develop strength, endurance, ROM, flexibility and posture for 45 minutes, including:   - Treadmill jog- 5 minutes   - Broad jumps - 3 laps   - Lateral Jumps - 38 inches - 3x5    - Skier jumps - 38 inches - 3x5   - Step and Hods - 3 laps   - A & B skips - 3 laps each   - Dynamic Warm Up - 10 minutes     - Leg swings at chair B     -front/back 2x10     -side/side 2x10    - quadruped Fire Hydrants B 2x10      -  quadruped Hip circles fwd/bkw B 2x10   - Bounding - 3 laps   - Speed Ladder - 100% effort - 2 laps each    - fwd 2 leg    - fwd 1 leg    - sideways    - backward    - icky shuffle    - icky shuffle with pause    - carioca back    - quick side step B    - Ali Shuffle          Neuromuscular re-education to improve kinesthetic awareness and motor control for 0 minutes, including:   - none performed today    Manual therapy for 10  minutes, including:   - hip joint mobilizations - lateral glides, posterior to anterior glides    Possible for next time:    - educate patient on self hip mobilization.   Patient Education and Home Exercises     Home Exercises Provided and Patient Education Provided     Education provided:   - importance of consistency with HEP    Written Home Exercises Provided: Patient instructed to cont prior HEP. Exercises were reviewed and Huber was able to demonstrate them prior to the end of the session.  Huber demonstrated good  understanding of the education provided. See EMR under Patient Instructions for exercises provided during therapy sessions    ASSESSMENT   Patient tolerated today's treatment well with no pain. He will benefit from continued physical therapy care as he progresses back to his sport activity.    Pt prognosis is Excellent.     Pt will continue to benefit from skilled outpatient physical therapy to address the deficits listed in the problem list box on initial evaluation, provide pt/family education and to maximize pt's level of independence in the home and community environment.     Pt's spiritual, cultural and educational needs considered and pt agreeable to plan of care and goals.     Anticipated barriers to physical therapy: none    GOALS: Short Term Goals:  6 weeks  1.Report decreased in pain at worst less than <   / =  2  /10  to increase tolerance for functional activities. (met, 2/18/22)  2. Pt to improve R hip flexion range of motion to 90 to allow for improved  functional mobility to allow for improvement in IADLs. (met)  3. Increased R hip abduction  MMT 1/2  to increase tolerance for ADL and work activities. (met, 2/18/22)  4. Pt to report ability to walk with no AD and no increased pain in R hip (met, 2/18/22)  5. Pt will be independent with HEP performance in order to continue PT progress at home. (2/18/22)        Long Term Goals:  8-12 weeks  1.Report decreased in pain at worst less than  <   / =  0  /10  to increase tolerance for functional mobility (progressing)  2.Pt will report ability to perform squat with no pain in R hip (met, 2/18/22)  3.Increased R hip flexion and extension MMT 1 grade  to increase tolerance for ADL and work activities. (met, 2/18/22)  4. Pt will report 20 % limitation or less on FOTO hip survey  to demonstrate increase in LE function with every day tasks. (progressing)  5. Pt to be Independent with progressed HEP (progressing)      PLAN   Continue to improve strength, neuromuscular control within parameters of protocol.    Plan of care Certification: 12/29/2021 to 3/29/22.       Tim Lee, PT

## 2022-04-05 ENCOUNTER — CLINICAL SUPPORT (OUTPATIENT)
Dept: REHABILITATION | Facility: HOSPITAL | Age: 22
End: 2022-04-05
Payer: COMMERCIAL

## 2022-04-05 DIAGNOSIS — R53.1 GENERALIZED WEAKNESS: ICD-10-CM

## 2022-04-05 DIAGNOSIS — M25.559 HIP PAIN: Primary | ICD-10-CM

## 2022-04-05 DIAGNOSIS — M25.60 DECREASED RANGE OF MOTION: ICD-10-CM

## 2022-04-05 PROCEDURE — 97110 THERAPEUTIC EXERCISES: CPT | Mod: PN

## 2022-04-05 PROCEDURE — 97140 MANUAL THERAPY 1/> REGIONS: CPT | Mod: PN

## 2022-04-05 NOTE — PROGRESS NOTES
[facility]   Physical Therapy Treatment Note     Name: Huber Mahajan  Clinic Number: [id number]    Therapy Diagnosis:   Encounter Diagnoses   Name Primary?    Hip pain Yes    Generalized weakness     Decreased range of motion      Physician: Baldo Davis    Visit Date: 4/5/2022  Physician Orders: PT Eval and Treat   Medical Diagnosis from Referral: S73.191A (ICD-10-CM) - Tear of right acetabular labrum, initial encounter  Evaluation Date: 12/29/2021  Authorization Period Expiration: 12/31/21  Plan of Care Expiration: 5/3/2022  Visit # / Visits authorized: 26/35 (1/1 eval)  PN DUE: 4/21/2022    Precautions: Standard        Right hip athroscopy with labral repair, femoroplasty, acetabuloplasty  Hip arthroscopy and labral repair protocol  eval and treat with modalities as needed        DOS: 12/20/21    Time In: 3:00 PM   Time Out: 3:45 PM   Total Billable Time: 45 minutes     SUBJECTIVE     Pt reports: He is doing well today and not having any hip pain.    He was compliant with home exercise program.  Response to previous treatment: Muscle soreness  ; minor hip joint soreness  Functional change: no notable change    Pain: 3/10   Location: non-specific, almost non-noticeable hip pain    OBJECTIVE     To be updated at next progress evaluation unless otherwise specified.      Treatment     Huber received the treatments listed below:      Therapeutic exercises to develop strength, endurance, ROM, flexibility and posture for 30 minutes, including:   - Treadmill jog- 5 minutes   - Broad jumps - 3 laps (not performed today)   - Lateral Jumps - 38 inches - 3x5  (not performed today)   - Skier jumps - 38 inches - 3x5 (not performed today)   - Step and Hods - 3 laps   - A & B skips - 3 laps each   - Dynamic Warm Up - 10 minutes     - Leg swings at chair B     -front/back 2x10     -side/side 2x10    - quadruped Fire Hydrants B 2x10      - quadruped Hip circles fwd/bkw B 2x10   -  Bounding - 3 laps   - Speed Ladder - 100% effort - 2 laps each    - fwd 2 leg    - fwd 1 leg    - sideways    - backward    - icky shuffle    - icky shuffle with pause    - carioca back    - quick side step B    - Ali Shuffle   - 85% sprint build ups 4x40 yards   - reverse nordic curls 2x5          Neuromuscular re-education to improve kinesthetic awareness and motor control for 0 minutes, including:   - none performed today    Manual therapy for 15  minutes, including:   - hip joint mobilizations - lateral glides, posterior to anterior glides    Possible for next time:    - educate patient on self hip mobilization.   Patient Education and Home Exercises     Home Exercises Provided and Patient Education Provided     Education provided:   - importance of consistency with HEP    Written Home Exercises Provided: Patient instructed to cont prior HEP. Exercises were reviewed and Huber was able to demonstrate them prior to the end of the session.  Huber demonstrated good  understanding of the education provided. See EMR under Patient Instructions for exercises provided during therapy sessions    ASSESSMENT   Patient demonstrates good progress with strength and neuromuscular control.  He will benefit from continued care.    Pt prognosis is Excellent.     Pt will continue to benefit from skilled outpatient physical therapy to address the deficits listed in the problem list box on initial evaluation, provide pt/family education and to maximize pt's level of independence in the home and community environment.     Pt's spiritual, cultural and educational needs considered and pt agreeable to plan of care and goals.     Anticipated barriers to physical therapy: none    GOALS: Short Term Goals:  6 weeks  1.Report decreased in pain at worst less than <   / =  2  /10  to increase tolerance for functional activities. (met, 2/18/22)  2. Pt to improve R hip flexion range of motion to 90 to allow for improved functional mobility to  allow for improvement in IADLs. (met)  3. Increased R hip abduction  MMT 1/2  to increase tolerance for ADL and work activities. (met, 2/18/22)  4. Pt to report ability to walk with no AD and no increased pain in R hip (met, 2/18/22)  5. Pt will be independent with HEP performance in order to continue PT progress at home. (2/18/22)        Long Term Goals:  8-12 weeks  1.Report decreased in pain at worst less than  <   / =  0  /10  to increase tolerance for functional mobility (progressing)  2.Pt will report ability to perform squat with no pain in R hip (met, 2/18/22)  3.Increased R hip flexion and extension MMT 1 grade  to increase tolerance for ADL and work activities. (met, 2/18/22)  4. Pt will report 20 % limitation or less on FOTO hip survey  to demonstrate increase in LE function with every day tasks. (progressing)  5. Pt to be Independent with progressed HEP (progressing)      PLAN   Continue to improve strength, neuromuscular control within parameters of protocol.    Plan of care Certification: 12/29/2021 to 3/29/22.       Tim Lee, PT

## 2022-04-07 ENCOUNTER — CLINICAL SUPPORT (OUTPATIENT)
Dept: REHABILITATION | Facility: HOSPITAL | Age: 22
End: 2022-04-07
Payer: COMMERCIAL

## 2022-04-07 DIAGNOSIS — R53.1 GENERALIZED WEAKNESS: ICD-10-CM

## 2022-04-07 DIAGNOSIS — M25.559 HIP PAIN: Primary | ICD-10-CM

## 2022-04-07 DIAGNOSIS — M25.60 DECREASED RANGE OF MOTION: ICD-10-CM

## 2022-04-07 PROCEDURE — 97110 THERAPEUTIC EXERCISES: CPT | Mod: PN

## 2022-04-07 NOTE — PROGRESS NOTES
[facility]   Physical Therapy Treatment Note     Name: Huber Mahajan  Clinic Number: [id number]    Therapy Diagnosis:   Encounter Diagnoses   Name Primary?    Hip pain Yes    Generalized weakness     Decreased range of motion      Physician: Baldo Davis    Visit Date: 4/7/2022  Physician Orders: PT Eval and Treat   Medical Diagnosis from Referral: S73.191A (ICD-10-CM) - Tear of right acetabular labrum, initial encounter  Evaluation Date: 12/29/2021  Authorization Period Expiration: 12/31/21  Plan of Care Expiration: 5/3/2022  Visit # / Visits authorized: 29/35 (1/1 eval)  PN DUE: 4/21/2022    Precautions: Standard        Right hip athroscopy with labral repair, femoroplasty, acetabuloplasty  Hip arthroscopy and labral repair protocol  eval and treat with modalities as needed        DOS: 12/20/21    Time In: 2:30 PM (patient arrived late)  Time Out: 3:00 PM   Total Billable Time: 30 minutes     SUBJECTIVE     Pt reports: His hip is doing well and he is having little to no pain. He reports he has been getting to the gym consistently.    He was compliant with home exercise program.  Response to previous treatment: Muscle soreness  ; minor hip joint soreness  Functional change: no notable change    Pain: 1/10   Location: non-specific, almost non-noticeable hip pain    OBJECTIVE     To be updated at next progress evaluation unless otherwise specified.      Treatment     Huber received the treatments listed below:      Therapeutic exercises to develop strength, endurance, ROM, flexibility and posture for 30 minutes, including:   - Treadmill jog- 5 minutes   - Broad jumps - 3 laps (not performed today)   - Lateral Jumps - 38 inches - 3x5  (not performed today)   - Skier jumps - 38 inches - 3x5 (not performed today)   - Step and Hods - 3 laps   - Single leg forward hopping - 2 laps   - single leg sideways hopping - 2 laps   - A & B skips - 3 laps each   - Dynamic Warm Up -  10 minutes     - Leg swings at chair B     -front/back 2x10     -side/side 2x10    - quadruped Fire Hydrants B 2x10      - quadruped Hip circles fwd/bkw B 2x10   - Bounding - 3 laps   - Speed Ladder - 100% effort - 2 laps each (not performed today)    - fwd 2 leg    - fwd 1 leg    - sideways    - backward    - icky shuffle    - icky shuffle with pause    - carioca back    - quick side step B    - Ali Shuffle   - 85% sprint build ups 4x40 yards   - Pro Agility x4 at 75% intensity   - reverse nordic curls 2x5       Neuromuscular re-education to improve kinesthetic awareness and motor control for 0 minutes, including:   - none performed today    Manual therapy for 15  minutes, including:   - hip joint mobilizations - lateral glides, posterior to anterior glides    Possible for next time:    - educate patient on self hip mobilization.   Patient Education and Home Exercises     Home Exercises Provided and Patient Education Provided     Education provided:   - importance of consistency with HEP    Written Home Exercises Provided: Patient instructed to cont prior HEP. Exercises were reviewed and Huber was able to demonstrate them prior to the end of the session.  Huber demonstrated good  understanding of the education provided. See EMR under Patient Instructions for exercises provided during therapy sessions    ASSESSMENT   Patient continues to demonstrate good progress with return-to-sport phase of rehab. Continue to work on improving strength and power.    Pt prognosis is Excellent.     Pt will continue to benefit from skilled outpatient physical therapy to address the deficits listed in the problem list box on initial evaluation, provide pt/family education and to maximize pt's level of independence in the home and community environment.     Pt's spiritual, cultural and educational needs considered and pt agreeable to plan of care and goals.     Anticipated barriers to physical therapy: none    GOALS: Short Term  Goals:  6 weeks  1.Report decreased in pain at worst less than <   / =  2  /10  to increase tolerance for functional activities. (met, 2/18/22)  2. Pt to improve R hip flexion range of motion to 90 to allow for improved functional mobility to allow for improvement in IADLs. (met)  3. Increased R hip abduction  MMT 1/2  to increase tolerance for ADL and work activities. (met, 2/18/22)  4. Pt to report ability to walk with no AD and no increased pain in R hip (met, 2/18/22)  5. Pt will be independent with HEP performance in order to continue PT progress at home. (2/18/22)        Long Term Goals:  8-12 weeks  1.Report decreased in pain at worst less than  <   / =  0  /10  to increase tolerance for functional mobility (progressing)  2.Pt will report ability to perform squat with no pain in R hip (met, 2/18/22)  3.Increased R hip flexion and extension MMT 1 grade  to increase tolerance for ADL and work activities. (met, 2/18/22)  4. Pt will report 20 % limitation or less on FOTO hip survey  to demonstrate increase in LE function with every day tasks. (progressing)  5. Pt to be Independent with progressed HEP (progressing)      PLAN   Continue to improve strength, neuromuscular control within parameters of protocol.    Plan of care Certification: 12/29/2021 to 5/3/22.       Tim Lee, PT

## 2022-04-08 ENCOUNTER — OFFICE VISIT (OUTPATIENT)
Dept: ORTHOPEDICS | Facility: CLINIC | Age: 22
End: 2022-04-08
Payer: COMMERCIAL

## 2022-04-08 VITALS — BODY MASS INDEX: 26.95 KG/M2 | WEIGHT: 199 LBS | HEIGHT: 72 IN

## 2022-04-08 DIAGNOSIS — M25.851 FEMOROACETABULAR IMPINGEMENT OF RIGHT HIP: Primary | ICD-10-CM

## 2022-04-08 PROCEDURE — 1159F MED LIST DOCD IN RCRD: CPT | Mod: CPTII,S$GLB,, | Performed by: STUDENT IN AN ORGANIZED HEALTH CARE EDUCATION/TRAINING PROGRAM

## 2022-04-08 PROCEDURE — 99024 PR POST-OP FOLLOW-UP VISIT: ICD-10-PCS | Mod: S$GLB,,, | Performed by: STUDENT IN AN ORGANIZED HEALTH CARE EDUCATION/TRAINING PROGRAM

## 2022-04-08 PROCEDURE — 1160F PR REVIEW ALL MEDS BY PRESCRIBER/CLIN PHARMACIST DOCUMENTED: ICD-10-PCS | Mod: CPTII,S$GLB,, | Performed by: STUDENT IN AN ORGANIZED HEALTH CARE EDUCATION/TRAINING PROGRAM

## 2022-04-08 PROCEDURE — 99999 PR PBB SHADOW E&M-EST. PATIENT-LVL III: CPT | Mod: PBBFAC,,, | Performed by: STUDENT IN AN ORGANIZED HEALTH CARE EDUCATION/TRAINING PROGRAM

## 2022-04-08 PROCEDURE — 99024 POSTOP FOLLOW-UP VISIT: CPT | Mod: S$GLB,,, | Performed by: STUDENT IN AN ORGANIZED HEALTH CARE EDUCATION/TRAINING PROGRAM

## 2022-04-08 PROCEDURE — 1160F RVW MEDS BY RX/DR IN RCRD: CPT | Mod: CPTII,S$GLB,, | Performed by: STUDENT IN AN ORGANIZED HEALTH CARE EDUCATION/TRAINING PROGRAM

## 2022-04-08 PROCEDURE — 3008F PR BODY MASS INDEX (BMI) DOCUMENTED: ICD-10-PCS | Mod: CPTII,S$GLB,, | Performed by: STUDENT IN AN ORGANIZED HEALTH CARE EDUCATION/TRAINING PROGRAM

## 2022-04-08 PROCEDURE — 99999 PR PBB SHADOW E&M-EST. PATIENT-LVL III: ICD-10-PCS | Mod: PBBFAC,,, | Performed by: STUDENT IN AN ORGANIZED HEALTH CARE EDUCATION/TRAINING PROGRAM

## 2022-04-08 PROCEDURE — 1159F PR MEDICATION LIST DOCUMENTED IN MEDICAL RECORD: ICD-10-PCS | Mod: CPTII,S$GLB,, | Performed by: STUDENT IN AN ORGANIZED HEALTH CARE EDUCATION/TRAINING PROGRAM

## 2022-04-08 PROCEDURE — 3008F BODY MASS INDEX DOCD: CPT | Mod: CPTII,S$GLB,, | Performed by: STUDENT IN AN ORGANIZED HEALTH CARE EDUCATION/TRAINING PROGRAM

## 2022-04-08 NOTE — PROGRESS NOTES
Orthopaedics  Post-operative follow-up    Procedure Performed:   1. Right hip arthroscopic labral repair  2. Right hip arthroscopic femoroplasty  3. Right hip arthroscopic acetabuloplasty  4. Right hip arthroscopic capsular closure     Date of Surgery: 12/20/2021    Subjective: Huber Mahajan is now approximately 3 months out from his hip arthroscopy.  He continues to make progress with pain and function.  He is doing very well with physical therapy and has progressed to running and has even done some plyometrics.  He still reports some subtle pinching with deep squat.    Exam:  Incisions well healed with no erythema or induration  Hip  with slight pinch  Walking without limp  Full knee ROM  Full ankle ROM, SILT over dorsum of foot    Impression:  S/p right hip arthroscopy, labral repair, femoroplasty, acetabuloplasty, and capsular closure, third post-operative visit - doing well    Plan:  Advance in activities per protocol  He is doing very well, but is only 3 months out from his surgery.  Discussed with him that is normal to still have some inflammation and slight discomfort especially with deep flexion in the hip.  It is okay for him to begin progressing with running and plyometrics.  Symptomatic treatment for pain / swelling  Instructed patient to call clinic if questions or concerns    Follow-up in 3 months as needed      Baldo Davis MD

## 2022-04-12 ENCOUNTER — CLINICAL SUPPORT (OUTPATIENT)
Dept: REHABILITATION | Facility: HOSPITAL | Age: 22
End: 2022-04-12
Payer: COMMERCIAL

## 2022-04-12 DIAGNOSIS — M25.60 DECREASED RANGE OF MOTION: ICD-10-CM

## 2022-04-12 DIAGNOSIS — R53.1 GENERALIZED WEAKNESS: ICD-10-CM

## 2022-04-12 DIAGNOSIS — M25.559 HIP PAIN: Primary | ICD-10-CM

## 2022-04-12 PROCEDURE — 97140 MANUAL THERAPY 1/> REGIONS: CPT | Mod: PN

## 2022-04-12 PROCEDURE — 97110 THERAPEUTIC EXERCISES: CPT | Mod: PN

## 2022-04-12 NOTE — PROGRESS NOTES
[facility]   Physical Therapy Treatment Note     Name: Huber Mahajan  Clinic Number: [id number]    Therapy Diagnosis:   Encounter Diagnoses   Name Primary?    Hip pain Yes    Generalized weakness     Decreased range of motion      Physician: Baldo Davis    Visit Date: 4/12/2022  Physician Orders: PT Eval and Treat   Medical Diagnosis from Referral: S73.191A (ICD-10-CM) - Tear of right acetabular labrum, initial encounter  Evaluation Date: 12/29/2021  Authorization Period Expiration: 12/31/21  Plan of Care Expiration: 5/3/2022  Visit # / Visits authorized: 29/35 (1/1 eval)  PN DUE: 4/21/2022    Precautions: Standard        Right hip athroscopy with labral repair, femoroplasty, acetabuloplasty  Hip arthroscopy and labral repair protocol  eval and treat with modalities as needed        DOS: 12/20/21    Time In: 1:26 PM  Time Out: 2:15 PM   Total Billable Time: 49 minutes     SUBJECTIVE     Pt reports: His MD appointment went well and that he was cleared for all activity. He reports he has not been having any hip pain.    He was compliant with home exercise program.  Response to previous treatment: Muscle soreness  ; minor hip joint soreness  Functional change: no notable change    Pain: 0/10   Location: non-specific, almost non-noticeable hip pain    OBJECTIVE     To be updated at next progress evaluation unless otherwise specified.      Treatment     Huber received the treatments listed below:      Therapeutic exercises to develop strength, endurance, ROM, flexibility and posture for 38 minutes, including:   - Treadmill jog- 5 minutes   - Broad jumps - 3 laps (not performed today)   - Lateral Jumps - 38 inches - 3x5  (not performed today)   - Skier jumps - 38 inches - 3x5 (not performed today)   - Step and Hods - 3 laps (not performed today)   - Single leg forward hopping - 2 laps (not performed today)   - single leg sideways hopping - 2 laps (not performed  today)   - A, B, C skips - 3 laps each   - Dynamic Warm Up - 10 minutes     - Leg swings at chair B     -front/back 2x10     -side/side 2x10    - quadruped Fire Hydrants B 2x10      - quadruped Hip circles fwd/bkw B 2x10   - Mitchell Stretch R - 2 minutes   - Bounding - 3 laps (not performed today)   - Slideboard - 3x30s   - Speed Ladder - 100% effort - 2 laps each  (not performed today)    - fwd 2 leg    - fwd 1 leg    - sideways    - backward    - icky shuffle    - icky shuffle with pause    - carioca back    - quick side step B    - Ali Shuffle   - 90% sprint build ups 6x40 yards   - Pro Agility x4 at 85% intensity   - Backpedal stop and sprint - 1x5   - Backpedal stop and sprint with degree cut - 1 each direction   - reverse nordic curls 3x5       Neuromuscular re-education to improve kinesthetic awareness and motor control for 0 minutes, including:   - none performed today    Manual therapy for 11 minutes, including:   - hip joint mobilizations - lateral glides, posterior to anterior glides    Possible for next time:    - educate patient on self hip mobilization.   Patient Education and Home Exercises     Home Exercises Provided and Patient Education Provided     Education provided:   - importance of consistency with HEP    Written Home Exercises Provided: Patient instructed to cont prior HEP. Exercises were reviewed and Huber was able to demonstrate them prior to the end of the session.  Huber demonstrated good  understanding of the education provided. See EMR under Patient Instructions for exercises provided during therapy sessions    ASSESSMENT   Patient again demonstrates good progress. Some pain noted with C-skips today. He will benefit from continued physical therapy care.    Pt prognosis is Excellent.     Pt will continue to benefit from skilled outpatient physical therapy to address the deficits listed in the problem list box on initial evaluation, provide pt/family education and to maximize pt's level  of independence in the home and community environment.     Pt's spiritual, cultural and educational needs considered and pt agreeable to plan of care and goals.     Anticipated barriers to physical therapy: none    GOALS: Short Term Goals:  6 weeks  1.Report decreased in pain at worst less than <   / =  2  /10  to increase tolerance for functional activities. (met, 2/18/22)  2. Pt to improve R hip flexion range of motion to 90 to allow for improved functional mobility to allow for improvement in IADLs. (met)  3. Increased R hip abduction  MMT 1/2  to increase tolerance for ADL and work activities. (met, 2/18/22)  4. Pt to report ability to walk with no AD and no increased pain in R hip (met, 2/18/22)  5. Pt will be independent with HEP performance in order to continue PT progress at home. (2/18/22)        Long Term Goals:  8-12 weeks  1.Report decreased in pain at worst less than  <   / =  0  /10  to increase tolerance for functional mobility (progressing)  2.Pt will report ability to perform squat with no pain in R hip (met, 2/18/22)  3.Increased R hip flexion and extension MMT 1 grade  to increase tolerance for ADL and work activities. (met, 2/18/22)  4. Pt will report 20 % limitation or less on FOTO hip survey  to demonstrate increase in LE function with every day tasks. (progressing)  5. Pt to be Independent with progressed HEP (progressing)      PLAN   Continue to improve strength, neuromuscular control within parameters of protocol.    Plan of care Certification: 12/29/2021 to 5/3/22.       Tim Lee, PT

## 2022-04-26 ENCOUNTER — CLINICAL SUPPORT (OUTPATIENT)
Dept: REHABILITATION | Facility: HOSPITAL | Age: 22
End: 2022-04-26
Payer: COMMERCIAL

## 2022-04-26 DIAGNOSIS — M25.559 HIP PAIN: Primary | ICD-10-CM

## 2022-04-26 DIAGNOSIS — R53.1 GENERALIZED WEAKNESS: ICD-10-CM

## 2022-04-26 DIAGNOSIS — M25.60 DECREASED RANGE OF MOTION: ICD-10-CM

## 2022-04-26 PROCEDURE — 97110 THERAPEUTIC EXERCISES: CPT | Mod: PN

## 2022-04-26 PROCEDURE — 97530 THERAPEUTIC ACTIVITIES: CPT | Mod: PN

## 2022-04-26 NOTE — PROGRESS NOTES
[facility]   Physical Therapy Treatment and Discharge Note     Name: Huber Mahajan  [facility] Number: [id number]    Therapy Diagnosis:   Encounter Diagnoses   Name Primary?    Hip pain Yes    Generalized weakness     Decreased range of motion      Physician: Baldo Davis    Visit Date: 2022  Physician Orders: PT Eval and Treat   Medical Diagnosis from Referral: S73.191A (ICD-10-CM) - Tear of right acetabular labrum, initial encounter  Evaluation Date: 2021  Authorization Period Expiration: 22  Plan of Care Expiration: 5/3/2022  Visit # / Visits authorized:  ( eval)    Precautions: Standard        Right hip athroscopy with labral repair, femoroplasty, acetabuloplasty  Hip arthroscopy and labral repair protocol  eval and treat with modalities as needed        DOS: 21    Time In: 2:15 PM  Time Out: 3:05 PM   Total Billable Time: 50 minutes     SUBJECTIVE     Pt reports: his hip feels great. He reports he has not had any trouble with his hip recently and has been exercising regularly. He reports he is confident and prepared to discharge from physical therapy.    He was compliant with home exercise program.  Response to previous treatment: no notable change  Functional change: no notable change    Pain: 0/10   Location: patient is pain-free    OBJECTIVE     Sensation: Light touch: intact to light touch     Gait: Good gait pattern with no pain     Hip Range of Motion:    Left Right   Internal rotation  90 45 degrees 47 degrees   External rotation   90 55 degrees 65 degrees   Flexion    120 degrees 120 degrees   Abduction  57 degrees 60 degrees         Lower Extremity Strength     Left Right   Knee extension: 71 69.1   Knee flexion: 66.7 66.8   Hip flexion in sittin 83.2   Hip Internal Rotation prone:  54.6    55.4      Hip External Rotation prone: 55.9    56      Hip extension in prone:  40.6 35.3   Hip abduction in sidelyin.4 45   Hip  adduction in hooklyin 53      Flexibility: within normal limits         Joint Mobility: within normal limits         Functional Tests:     Left Right Difference   Y Balance:         Posteriomedial 75 cm 80 cm +5 cm   Posterolateral 86 cm 84 cm 2 cm   Anterior 69 cm 69 cm 0 cm             6 inch 30 second Step-down test 27 reps 28 reps +1 reps                          Alma Hop Test Involved leg Non-involved leg LSI Percentage   Single leg single hop for distance 72 80 90%   Single leg 6 meter hop for time 1.67 1.75 105%   Single leg triple hop for distance 202 209 97%   Single leg crossover triple hop for distance 194 203 96%                 CMS Impairment/Limitation/Restriction for FOTO Hip Survey     Therapist reviewed FOTO scores for Huber Mahajan on 2022.   FOTO documents entered into [employer] - see Media section.     Limitation Score: 1%              Treatment     Huber received the treatments listed below:      Therapeutic exercises to develop strength, endurance, ROM, flexibility and posture for 10 minutes, including:   - Treadmill jog- 5 minutes   - Manually resisted hip IR/ER    Neuromuscular re-education to improve kinesthetic awareness and motor control for 0 minutes, including:   - none performed today    Manual therapy for 0 minutes, including:   - none performed today    Therapeutic activity was performed for 40 minutes to establish and restore functional ability:    - Return to sport testing:    - alma hop testing    - cutting    - pivoting    - sprinting    - y balance    Possible for next time:    - educate patient on self hip mobilization.     ASSESSMENT     Patient has done very well with physical therapy and has achieved all goals.  He demonstrates good limb symmetry and excellent performance with plyometric a nd dynamic movements including jumping, sprinting, and cutting.  He was instructed to continue an independent fitness program and call or return to physical therapy with any  concerns or questions. He is discharged from physical therapy today.    GOALS: Short Term Goals:  6 weeks  1.Report decreased in pain at worst less than <   / =  2  /10  to increase tolerance for functional activities. (met, 2/18/22)  2. Pt to improve R hip flexion range of motion to 90 to allow for improved functional mobility to allow for improvement in IADLs. (met)  3. Increased R hip abduction  MMT 1/2  to increase tolerance for ADL and work activities. (met, 2/18/22)  4. Pt to report ability to walk with no AD and no increased pain in R hip (met, 2/18/22)  5. Pt will be independent with HEP performance in order to continue PT progress at home. (2/18/22)        Long Term Goals:  8-12 weeks  1.Report decreased in pain at worst less than  <   / =  0  /10  to increase tolerance for functional mobility (met, 4/26/22)  2.Pt will report ability to perform squat with no pain in R hip (met, 2/18/22)  3.Increased R hip flexion and extension MMT 1 grade  to increase tolerance for ADL and work activities. (met, 2/18/22)  4. Pt will report 20 % limitation or less on FOTO hip survey  to demonstrate increase in LE function with every day tasks. (met, 4/26/22)  5. Pt to be Independent with progressed HEP (met, 4/26/22)      PLAN   Discharge from physical therapy to independent fitness program.      Tim Lee PT      [facility]  Physical Therapy Discharge Note    Name: Huber Mahajan  Clinic Number: [id number]    Therapy Diagnosis:   Encounter Diagnoses   Name Primary?    Hip pain Yes    Generalized weakness     Decreased range of motion      Physician: Baldo Davis    Date of Last visit: 4/26/2022  Total Visits Received: 31    ASSESSMENT      Discharge reason: Patient is now asymptomatic and Patient has met all of his/her goals    Discharge FOTO Score: 1% limitation      PLAN   This patient is discharged from Physical Therapy      Tim Lee PT

## 2022-10-01 ENCOUNTER — PATIENT MESSAGE (OUTPATIENT)
Dept: ORTHOPEDICS | Facility: CLINIC | Age: 22
End: 2022-10-01
Payer: COMMERCIAL

## 2022-10-14 ENCOUNTER — HOSPITAL ENCOUNTER (OUTPATIENT)
Dept: RADIOLOGY | Facility: HOSPITAL | Age: 22
Discharge: HOME | End: 2022-10-14
Attending: STUDENT IN AN ORGANIZED HEALTH CARE EDUCATION/TRAINING PROGRAM
Payer: COMMERCIAL

## 2022-10-14 ENCOUNTER — OFFICE VISIT (OUTPATIENT)
Dept: ORTHOPEDICS | Facility: CLINIC | Age: 22
End: 2022-10-14
Payer: COMMERCIAL

## 2022-10-14 VITALS — WEIGHT: 199 LBS | HEIGHT: 72 IN | BODY MASS INDEX: 26.95 KG/M2

## 2022-10-14 DIAGNOSIS — S73.191D TEAR OF RIGHT ACETABULAR LABRUM, SUBSEQUENT ENCOUNTER: Primary | ICD-10-CM

## 2022-10-14 DIAGNOSIS — S73.191A TEAR OF RIGHT ACETABULAR LABRUM, INITIAL ENCOUNTER: ICD-10-CM

## 2022-10-14 DIAGNOSIS — M25.559 PAIN IN UNSPECIFIED HIP: ICD-10-CM

## 2022-10-14 PROCEDURE — 99214 PR OFFICE/OUTPT VISIT, EST, LEVL IV, 30-39 MIN: ICD-10-PCS | Mod: S$GLB,,, | Performed by: STUDENT IN AN ORGANIZED HEALTH CARE EDUCATION/TRAINING PROGRAM

## 2022-10-14 PROCEDURE — 73521 X-RAY EXAM HIPS BI 2 VIEWS: CPT | Mod: 26,,, | Performed by: RADIOLOGY

## 2022-10-14 PROCEDURE — 73521 XR HIPS BILATERAL 2 VIEW INCL AP PELVIS: ICD-10-PCS | Mod: 26,,, | Performed by: RADIOLOGY

## 2022-10-14 PROCEDURE — 1159F MED LIST DOCD IN RCRD: CPT | Mod: CPTII,S$GLB,, | Performed by: STUDENT IN AN ORGANIZED HEALTH CARE EDUCATION/TRAINING PROGRAM

## 2022-10-14 PROCEDURE — 99214 OFFICE O/P EST MOD 30 MIN: CPT | Mod: S$GLB,,, | Performed by: STUDENT IN AN ORGANIZED HEALTH CARE EDUCATION/TRAINING PROGRAM

## 2022-10-14 PROCEDURE — 99999 PR PBB SHADOW E&M-EST. PATIENT-LVL III: ICD-10-PCS | Mod: PBBFAC,,, | Performed by: STUDENT IN AN ORGANIZED HEALTH CARE EDUCATION/TRAINING PROGRAM

## 2022-10-14 PROCEDURE — 1159F PR MEDICATION LIST DOCUMENTED IN MEDICAL RECORD: ICD-10-PCS | Mod: CPTII,S$GLB,, | Performed by: STUDENT IN AN ORGANIZED HEALTH CARE EDUCATION/TRAINING PROGRAM

## 2022-10-14 PROCEDURE — 99999 PR PBB SHADOW E&M-EST. PATIENT-LVL III: CPT | Mod: PBBFAC,,, | Performed by: STUDENT IN AN ORGANIZED HEALTH CARE EDUCATION/TRAINING PROGRAM

## 2022-10-14 PROCEDURE — 73521 X-RAY EXAM HIPS BI 2 VIEWS: CPT | Mod: TC

## 2022-10-14 PROCEDURE — 1160F PR REVIEW ALL MEDS BY PRESCRIBER/CLIN PHARMACIST DOCUMENTED: ICD-10-PCS | Mod: CPTII,S$GLB,, | Performed by: STUDENT IN AN ORGANIZED HEALTH CARE EDUCATION/TRAINING PROGRAM

## 2022-10-14 PROCEDURE — 1160F RVW MEDS BY RX/DR IN RCRD: CPT | Mod: CPTII,S$GLB,, | Performed by: STUDENT IN AN ORGANIZED HEALTH CARE EDUCATION/TRAINING PROGRAM

## 2022-10-14 NOTE — PROGRESS NOTES
Orthopaedics  Post-operative follow-up    Procedure Performed:   1. Right hip arthroscopic labral repair  2. Right hip arthroscopic femoroplasty  3. Right hip arthroscopic acetabuloplasty  4. Right hip arthroscopic capsular closure     Date of Surgery: 12/20/2021    Subjective: Huber Mahajan is now approximately 10 months out from his hip arthroscopy.  He has since transferred to [employer].  He's had more pain and discomfort starting around August of this year.  He reports discomfort with prolonged walking around campus and sitting for long periods in class.  Discomfort is localized around the anterior hip.  He also reports significant pain with participation in athletic endeavors including flag football.    Exam:  Incisions well healed with no erythema or induration  Hip ROM:    ER 60  IR 30  + [facility]  Walking without limp  Full knee ROM  Full ankle ROM, SILT over dorsum of foot    Imaging:  X-Ray Hips Bilateral 2 View Incl AP Pelvis  Narrative: EXAMINATION:  XR HIPS BILATERAL 2 VIEW INCL AP PELVIS    INDICATION:  Other sprain of right hip, initial encounter    COMPARISON:  Pelvic radiographs from 10/15/2021    FINDINGS:  AP view of the pelvis obtained with AP and oblique views of both hips.  Bone mineralization is normal.  There is no fracture.  Alignment is anatomic.  Contours of the femoral heads are preserved.  SI joints are intact.  Intact pubic symphysis.  Unremarkable soft tissues.  Impression: 1. No evidence of acute injury to the pelvis.    Electronically signed by: Baldo Wood MD  Date:    10/14/2022  Time:    13:21      Impression:  S/p right hip arthroscopy, labral repair, femoroplasty, acetabuloplasty, and capsular closure, onset of groin pain concerning for labral tear    Plan:  Discussed diagnosis and treatment options with him and his father today.  His symptoms are concerning for labral tear.  I went over with them I am not sure whether this would be from new injury  or failure of the labrum to appropriately heal.  I would like to get an MRI arthrogram of the right hip to evaluate the integrity of the labrum  Symptomatic treatment for pain / swelling  Instructed patient to call clinic if questions or concerns    Follow-up after MRI Arthrogram      Baldo Davis MD

## 2022-11-10 ENCOUNTER — PATIENT MESSAGE (OUTPATIENT)
Dept: PRIMARY CARE CLINIC | Facility: CLINIC | Age: 22
End: 2022-11-10

## 2022-11-10 ENCOUNTER — TELEPHONE (OUTPATIENT)
Dept: PRIMARY CARE CLINIC | Facility: CLINIC | Age: 22
End: 2022-11-10

## 2022-11-10 ENCOUNTER — LAB VISIT (OUTPATIENT)
Dept: LAB | Facility: HOSPITAL | Age: 22
End: 2022-11-10
Attending: FAMILY MEDICINE
Payer: COMMERCIAL

## 2022-11-10 ENCOUNTER — OFFICE VISIT (OUTPATIENT)
Dept: PRIMARY CARE CLINIC | Facility: CLINIC | Age: 22
End: 2022-11-10
Payer: COMMERCIAL

## 2022-11-10 VITALS
HEIGHT: 72 IN | DIASTOLIC BLOOD PRESSURE: 66 MMHG | OXYGEN SATURATION: 98 % | HEART RATE: 55 BPM | SYSTOLIC BLOOD PRESSURE: 110 MMHG | BODY MASS INDEX: 26.16 KG/M2 | TEMPERATURE: 98 F | WEIGHT: 193.13 LBS

## 2022-11-10 DIAGNOSIS — L65.9 HAIR LOSS: ICD-10-CM

## 2022-11-10 DIAGNOSIS — Z86.16 HISTORY OF COVID-19: ICD-10-CM

## 2022-11-10 DIAGNOSIS — Z23 NEED FOR HEPATITIS A IMMUNIZATION: ICD-10-CM

## 2022-11-10 DIAGNOSIS — Z00.00 ROUTINE GENERAL MEDICAL EXAMINATION AT A HEALTH CARE FACILITY: ICD-10-CM

## 2022-11-10 DIAGNOSIS — F41.1 ANXIETY STATE: ICD-10-CM

## 2022-11-10 DIAGNOSIS — Z00.00 ROUTINE GENERAL MEDICAL EXAMINATION AT A HEALTH CARE FACILITY: Primary | ICD-10-CM

## 2022-11-10 LAB
ALBUMIN SERPL BCP-MCNC: 4.4 G/DL (ref 3.5–5.2)
ALP SERPL-CCNC: 89 U/L (ref 55–135)
ALT SERPL W/O P-5'-P-CCNC: 42 U/L (ref 10–44)
ANION GAP SERPL CALC-SCNC: 11 MMOL/L (ref 8–16)
AST SERPL-CCNC: 41 U/L (ref 10–40)
BASOPHILS # BLD AUTO: 0.04 K/UL (ref 0–0.2)
BASOPHILS NFR BLD: 0.5 % (ref 0–1.9)
BILIRUB SERPL-MCNC: 1.3 MG/DL (ref 0.1–1)
BUN SERPL-MCNC: 18 MG/DL (ref 6–20)
CALCIUM SERPL-MCNC: 9.8 MG/DL (ref 8.7–10.5)
CHLORIDE SERPL-SCNC: 104 MMOL/L (ref 95–110)
CHOLEST SERPL-MCNC: 120 MG/DL (ref 120–199)
CHOLEST/HDLC SERPL: 2.4 {RATIO} (ref 2–5)
CO2 SERPL-SCNC: 26 MMOL/L (ref 23–29)
CREAT SERPL-MCNC: 1 MG/DL (ref 0.5–1.4)
DIFFERENTIAL METHOD: NORMAL
EOSINOPHIL # BLD AUTO: 0.1 K/UL (ref 0–0.5)
EOSINOPHIL NFR BLD: 1.4 % (ref 0–8)
ERYTHROCYTE [DISTWIDTH] IN BLOOD BY AUTOMATED COUNT: 12.6 % (ref 11.5–14.5)
EST. GFR  (NO RACE VARIABLE): >60 ML/MIN/1.73 M^2
ESTIMATED AVG GLUCOSE: 103 MG/DL (ref 68–131)
GLUCOSE SERPL-MCNC: 83 MG/DL (ref 70–110)
HBA1C MFR BLD: 5.2 % (ref 4–5.6)
HCT VFR BLD AUTO: 47.2 % (ref 40–54)
HDLC SERPL-MCNC: 51 MG/DL (ref 40–75)
HDLC SERPL: 42.5 % (ref 20–50)
HGB BLD-MCNC: 15.5 G/DL (ref 14–18)
IMM GRANULOCYTES # BLD AUTO: 0.01 K/UL (ref 0–0.04)
IMM GRANULOCYTES NFR BLD AUTO: 0.1 % (ref 0–0.5)
LDLC SERPL CALC-MCNC: 58.2 MG/DL (ref 63–159)
LYMPHOCYTES # BLD AUTO: 3 K/UL (ref 1–4.8)
LYMPHOCYTES NFR BLD: 39.7 % (ref 18–48)
MCH RBC QN AUTO: 29.7 PG (ref 27–31)
MCHC RBC AUTO-ENTMCNC: 32.8 G/DL (ref 32–36)
MCV RBC AUTO: 90 FL (ref 82–98)
MONOCYTES # BLD AUTO: 0.6 K/UL (ref 0.3–1)
MONOCYTES NFR BLD: 7.2 % (ref 4–15)
NEUTROPHILS # BLD AUTO: 3.9 K/UL (ref 1.8–7.7)
NEUTROPHILS NFR BLD: 51.1 % (ref 38–73)
NONHDLC SERPL-MCNC: 69 MG/DL
NRBC BLD-RTO: 0 /100 WBC
PLATELET # BLD AUTO: 198 K/UL (ref 150–450)
PMV BLD AUTO: 11.5 FL (ref 9.2–12.9)
POTASSIUM SERPL-SCNC: 4.1 MMOL/L (ref 3.5–5.1)
PROT SERPL-MCNC: 7.2 G/DL (ref 6–8.4)
RBC # BLD AUTO: 5.22 M/UL (ref 4.6–6.2)
SODIUM SERPL-SCNC: 141 MMOL/L (ref 136–145)
T4 FREE SERPL-MCNC: 0.85 NG/DL (ref 0.71–1.51)
TRIGL SERPL-MCNC: 54 MG/DL (ref 30–150)
TSH SERPL DL<=0.005 MIU/L-ACNC: 2.21 UIU/ML (ref 0.4–4)
WBC # BLD AUTO: 7.66 K/UL (ref 3.9–12.7)

## 2022-11-10 PROCEDURE — 84443 ASSAY THYROID STIM HORMONE: CPT | Performed by: FAMILY MEDICINE

## 2022-11-10 PROCEDURE — 3008F PR BODY MASS INDEX (BMI) DOCUMENTED: ICD-10-PCS | Mod: CPTII,S$GLB,, | Performed by: FAMILY MEDICINE

## 2022-11-10 PROCEDURE — 3008F BODY MASS INDEX DOCD: CPT | Mod: CPTII,S$GLB,, | Performed by: FAMILY MEDICINE

## 2022-11-10 PROCEDURE — 1159F PR MEDICATION LIST DOCUMENTED IN MEDICAL RECORD: ICD-10-PCS | Mod: CPTII,S$GLB,, | Performed by: FAMILY MEDICINE

## 2022-11-10 PROCEDURE — 3078F DIAST BP <80 MM HG: CPT | Mod: CPTII,S$GLB,, | Performed by: FAMILY MEDICINE

## 2022-11-10 PROCEDURE — 99385 PREV VISIT NEW AGE 18-39: CPT | Mod: S$GLB,,, | Performed by: FAMILY MEDICINE

## 2022-11-10 PROCEDURE — 85025 COMPLETE CBC W/AUTO DIFF WBC: CPT | Performed by: FAMILY MEDICINE

## 2022-11-10 PROCEDURE — 80061 LIPID PANEL: CPT | Performed by: FAMILY MEDICINE

## 2022-11-10 PROCEDURE — 3074F PR MOST RECENT SYSTOLIC BLOOD PRESSURE < 130 MM HG: ICD-10-PCS | Mod: CPTII,S$GLB,, | Performed by: FAMILY MEDICINE

## 2022-11-10 PROCEDURE — 3074F SYST BP LT 130 MM HG: CPT | Mod: CPTII,S$GLB,, | Performed by: FAMILY MEDICINE

## 2022-11-10 PROCEDURE — 84439 ASSAY OF FREE THYROXINE: CPT | Performed by: FAMILY MEDICINE

## 2022-11-10 PROCEDURE — 80053 COMPREHEN METABOLIC PANEL: CPT | Performed by: FAMILY MEDICINE

## 2022-11-10 PROCEDURE — 36415 COLL VENOUS BLD VENIPUNCTURE: CPT | Mod: PN | Performed by: FAMILY MEDICINE

## 2022-11-10 PROCEDURE — 99999 PR PBB SHADOW E&M-EST. PATIENT-LVL IV: ICD-10-PCS | Mod: PBBFAC,,, | Performed by: FAMILY MEDICINE

## 2022-11-10 PROCEDURE — 1159F MED LIST DOCD IN RCRD: CPT | Mod: CPTII,S$GLB,, | Performed by: FAMILY MEDICINE

## 2022-11-10 PROCEDURE — 83036 HEMOGLOBIN GLYCOSYLATED A1C: CPT | Performed by: FAMILY MEDICINE

## 2022-11-10 PROCEDURE — 3078F PR MOST RECENT DIASTOLIC BLOOD PRESSURE < 80 MM HG: ICD-10-PCS | Mod: CPTII,S$GLB,, | Performed by: FAMILY MEDICINE

## 2022-11-10 PROCEDURE — 99999 PR PBB SHADOW E&M-EST. PATIENT-LVL IV: CPT | Mod: PBBFAC,,, | Performed by: FAMILY MEDICINE

## 2022-11-10 PROCEDURE — 99385 PR PREVENTIVE VISIT,NEW,18-39: ICD-10-PCS | Mod: S$GLB,,, | Performed by: FAMILY MEDICINE

## 2022-11-10 RX ORDER — FINASTERIDE 1 MG/1
1 TABLET, FILM COATED ORAL DAILY
Qty: 90 TABLET | Refills: 3 | Status: SHIPPED | OUTPATIENT
Start: 2022-11-10 | End: 2022-12-10

## 2022-11-10 NOTE — PROGRESS NOTES
Subjective:      Patient ID: Huber Mahajan is a 21 y.o. male.    Chief Complaint: Establish Care (Hair loss)      Huber Mahajan is a 21 y.o. male presenting to clinic today to establish care. This is a new pt to me.     Pt has Hx of ANAT. Reports FMHx of anxiety disorder. Pt used to take Zoloft for ANAT but is not currently taking it. Pt uses meditation to control anxiety with good results.   Pt reports he has been experiencing hair thinning for the past month; reports only family member with Hx of hair thinning is his uncle on maternal side. Pt thinks sx may be due to recent stress increase. Pt has had COVID infection 2 times in the past year.   Pt had hip surgery on 12/21.   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   He is not regularly exercising at the time.  Pt has not had recent EKG. He had recent workup that resulted negative for WPW.   Pt is UTD with vaccinations; he is just missing second dose of Hep A immunization.   No other complaint at this time.     No questionnaires on file.  Past Medical History:   Diagnosis Date    Complex partial seizure disorder 11/01/2012    ANAT (generalized anxiety disorder)      Past Surgical History:   Procedure Laterality Date    ACETABULOPLASTY Right 12/20/2021    Procedure: ACETABULOPLASTY;  Surgeon: Baldo Davis MD;  Location: [facility] OR;  Service: Orthopedics;  Laterality: Right;    ARTHROSCOPY, HIP Right 12/20/2021    Procedure: ARTHROSCOPY, HIP, WITH FEMOROPLASTY;  Surgeon: Baldo Davis MD;  Location: [facility] OR;  Service: Orthopedics;  Laterality: Right;  Right hip arthroscopic capsular closure    ARTHROSCOPY, HIP Right 12/20/2021    Procedure: ARTHROSCOPY, HIP, WITH LABRUM REPAIR;  Surgeon: Baldo Davis MD;  Location: [facility] OR;  Service: Orthopedics;  Laterality: Right;    right ring finger       Family History   Problem Relation Age of Onset    No Known Problems Mother     Mikal Parkinson White syndrome Father     Anxiety disorder Father      Alopecia Maternal Uncle      Social History     Tobacco Use    Smoking status: Never    Smokeless tobacco: Never   Substance and Sexual Activity    Alcohol use: Never    Drug use: Never    Sexual activity: Not on file       LABS:   No results found for: HGBA1C   No results found for: CHOL  No results found for: LDLCALC  Lab Results   Component Value Date    WBC 9.15 12/10/2021    HGB 15.6 12/10/2021    HCT 48.5 12/10/2021     12/10/2021    ALT 12 12/10/2021    AST 23 12/10/2021     12/10/2021    K 4.7 12/10/2021     12/10/2021    CREATININE 0.9 12/10/2021    BUN 18 12/10/2021    CO2 26 12/10/2021       X-Ray Hips Bilateral 2 View Incl AP Pelvis  Narrative: EXAMINATION:  XR HIPS BILATERAL 2 VIEW INCL AP PELVIS    INDICATION:  Other sprain of right hip, initial encounter    COMPARISON:  Pelvic radiographs from 10/15/2021    FINDINGS:  AP view of the pelvis obtained with AP and oblique views of both hips.  Bone mineralization is normal.  There is no fracture.  Alignment is anatomic.  Contours of the femoral heads are preserved.  SI joints are intact.  Intact pubic symphysis.  Unremarkable soft tissues.  Impression: 1. No evidence of acute injury to the pelvis.    Electronically signed by: Baldo Wood MD  Date:    10/14/2022  Time:    13:21        Review of Systems   Constitutional:  Negative for activity change, appetite change, chills, fatigue and unexpected weight change.   HENT:  Negative for congestion, ear pain, postnasal drip, sneezing, sore throat and trouble swallowing.         + hair thinning   Eyes:  Negative for pain and visual disturbance.   Respiratory:  Negative for cough and shortness of breath.    Cardiovascular:  Negative for chest pain and leg swelling.   Gastrointestinal:  Negative for abdominal pain, constipation, diarrhea, nausea and vomiting.   Endocrine: Negative for cold intolerance and heat intolerance.   Genitourinary:  Negative for difficulty urinating, dysuria and  flank pain.   Musculoskeletal:  Negative for arthralgias, back pain, joint swelling and neck pain.   Skin:  Negative for color change and rash.   Neurological:  Negative for dizziness, seizures and headaches.   Psychiatric/Behavioral:  Negative for behavioral problems, dysphoric mood and sleep disturbance. The patient is not nervous/anxious.    Objective:     Vitals:    11/10/22 0736   BP: 110/66   Pulse: (!) 55   Temp: 98 °F (36.7 °C)   TempSrc: Temporal   SpO2: 98%   Weight: 87.6 kg (193 lb 2 oz)   Height: 6' (1.829 m)     Wt Readings from Last 10 Encounters:   11/10/22 87.6 kg (193 lb 2 oz)   10/14/22 90.3 kg (199 lb)   04/08/22 90.3 kg (199 lb)   02/04/22 90.3 kg (199 lb)   01/04/22 90.3 kg (199 lb)   12/20/21 90.4 kg (199 lb 3 oz)   11/19/21 90.7 kg (200 lb)   10/15/21 90.7 kg (200 lb)     Physical Exam  Vitals reviewed.   Constitutional:       General: He is not in acute distress.     Appearance: Normal appearance. He is well-developed and normal weight.   HENT:      Head: Normocephalic and atraumatic.      Right Ear: Tympanic membrane and external ear normal.      Left Ear: Tympanic membrane and external ear normal.      Nose: Nose normal.      Mouth/Throat:      Mouth: Mucous membranes are moist.      Pharynx: Oropharynx is clear.   Eyes:      Conjunctiva/sclera: Conjunctivae normal.      Pupils: Pupils are equal, round, and reactive to light.   Neck:      Thyroid: No thyromegaly.   Cardiovascular:      Rate and Rhythm: Normal rate and regular rhythm.      Heart sounds: No murmur heard.    No friction rub. No gallop.   Pulmonary:      Effort: Pulmonary effort is normal. No respiratory distress.      Breath sounds: Normal breath sounds.   Abdominal:      General: Bowel sounds are normal. There is no distension.      Palpations: Abdomen is soft.      Tenderness: There is no abdominal tenderness. There is no rebound.   Musculoskeletal:         General: Normal range of motion.      Cervical back: Normal range of  motion and neck supple.   Lymphadenopathy:      Cervical: No cervical adenopathy.   Skin:     General: Skin is warm and dry.             Comments: Hair thinning is 's peak pattern   Neurological:      General: No focal deficit present.      Mental Status: He is alert and oriented to person, place, and time.      Coordination: Coordination normal.   Psychiatric:         Attention and Perception: Attention normal.         Mood and Affect: Mood and affect normal.         Speech: Speech normal.         Behavior: Behavior normal.         Thought Content: Thought content normal.         Cognition and Memory: Cognition normal.         Judgment: Judgment normal.     Assessment:     1. Hair loss    2. Anxiety state    3. History of COVID-19    4. Routine general medical examination at a health care facility    5. Need for hepatitis A immunization      Plan:   Huber was seen today for establish care.    Diagnoses and all orders for this visit:    Hair loss  -     finasteride (PROPECIA) 1 mg tablet; Take 1 tablet (1 mg total) by mouth once daily.  -     Ambulatory referral/consult to Dermatology; Future    Anxiety state    History of COVID-19  Comments:  x2 Aug 2022, Nov 2021.    Routine general medical examination at a health care facility  -     TSH; Future  -     Hemoglobin A1C; Future  -     Lipid Panel; Future  -     Comprehensive Metabolic Panel; Future  -     CBC Auto Differential; Future  -     T4, Free; Future  -     finasteride (PROPECIA) 1 mg tablet; Take 1 tablet (1 mg total) by mouth once daily.  -     Ambulatory referral/consult to Dermatology; Future    Need for hepatitis A immunization  -     (In Office Administered) Hepatitis A Vaccine (Adult) (IM); Future      Rx Propecia 1 mg/day to aid in hair loss.  Rx Rogaine shampoo to help delay hair loss. OTC, refer to Derm for additional workup if above not affective.   Lab work ordered to be completed today.   Advised to get second dose of Hep A vaccine.    Instructed to f/u in 1 month for Hep A shot.     Health Maintenance Due   Topic Date Due    Hepatitis C Screening  Never done    Lipid Panel  Never done    COVID-19 Vaccine (1) Never done    HPV Vaccines (1 - Male 2-dose series) Never done    HIV Screening  Never done    Influenza Vaccine (1) 09/01/2022     The ASCVD Risk score (Salty BEYER, et al., 2019) failed to calculate for the following reasons:    The 2019 ASCVD risk score is only valid for ages 40 to 79  Follow up in about 1 year (around 11/10/2023) for physical with Dr ROCKWELL.    There are no Patient Instructions on file for this visit.    Scribe Attestation:   I, Ross Wills, am scribing for, and in the presence of, Dr. Meme Rockwell MD. I performed the above scribed service and the documentation accurately describes the services I performed. I attest to the accuracy of the note.    I, Dr. Meme Rockwell MD, reviewed documentation as scribed above. I personally performed the services described in this documentation.  I agree that the record reflects my personal performance and is accurate and complete. Meme Rockwell MD.    11/10/2022

## 2022-11-11 ENCOUNTER — HOSPITAL ENCOUNTER (OUTPATIENT)
Dept: RADIOLOGY | Facility: HOSPITAL | Age: 22
Discharge: HOME | End: 2022-11-11
Attending: STUDENT IN AN ORGANIZED HEALTH CARE EDUCATION/TRAINING PROGRAM
Payer: COMMERCIAL

## 2022-11-11 DIAGNOSIS — M25.559 PAIN IN UNSPECIFIED HIP: ICD-10-CM

## 2022-11-11 DIAGNOSIS — S73.191D TEAR OF RIGHT ACETABULAR LABRUM, SUBSEQUENT ENCOUNTER: ICD-10-CM

## 2022-11-11 PROCEDURE — 25500020 PHARM REV CODE 255: Performed by: STUDENT IN AN ORGANIZED HEALTH CARE EDUCATION/TRAINING PROGRAM

## 2022-11-11 PROCEDURE — 77002 NEEDLE LOCALIZATION BY XRAY: CPT | Mod: 26,RT,, | Performed by: RADIOLOGY

## 2022-11-11 PROCEDURE — 73722 MRI JOINT OF LWR EXTR W/DYE: CPT | Mod: 26,RT,, | Performed by: RADIOLOGY

## 2022-11-11 PROCEDURE — 27093 INJECTION FOR HIP X-RAY: CPT | Mod: RT,,, | Performed by: RADIOLOGY

## 2022-11-11 PROCEDURE — 25000003 PHARM REV CODE 250: Performed by: STUDENT IN AN ORGANIZED HEALTH CARE EDUCATION/TRAINING PROGRAM

## 2022-11-11 PROCEDURE — 27093 XR ARTHROGRAM HIP RIGHT, INJECTION ONLY WITH MRI TO FOLLOW (XPD): ICD-10-PCS | Mod: RT,,, | Performed by: RADIOLOGY

## 2022-11-11 PROCEDURE — 77002 XR ARTHROGRAM HIP RIGHT, INJECTION ONLY WITH MRI TO FOLLOW (XPD): ICD-10-PCS | Mod: 26,RT,, | Performed by: RADIOLOGY

## 2022-11-11 PROCEDURE — 73722 MRI ARTHROGRAM HIP RIGHT: ICD-10-PCS | Mod: 26,RT,, | Performed by: RADIOLOGY

## 2022-11-11 PROCEDURE — A9585 GADOBUTROL INJECTION: HCPCS | Performed by: STUDENT IN AN ORGANIZED HEALTH CARE EDUCATION/TRAINING PROGRAM

## 2022-11-11 PROCEDURE — 73722 MRI JOINT OF LWR EXTR W/DYE: CPT | Mod: TC,RT

## 2022-11-11 RX ORDER — LIDOCAINE HYDROCHLORIDE 10 MG/ML
10 INJECTION INFILTRATION; PERINEURAL ONCE
Status: COMPLETED | OUTPATIENT
Start: 2022-11-11 | End: 2022-11-11

## 2022-11-11 RX ORDER — GADOBUTROL 604.72 MG/ML
7.5 INJECTION INTRAVENOUS
Status: COMPLETED | OUTPATIENT
Start: 2022-11-11 | End: 2022-11-11

## 2022-11-11 RX ADMIN — IOHEXOL 20 ML: 350 INJECTION, SOLUTION INTRAVENOUS at 10:11

## 2022-11-11 RX ADMIN — GADOBUTROL 0.1 ML: 604.72 INJECTION INTRAVENOUS at 10:11

## 2022-11-11 RX ADMIN — LIDOCAINE HYDROCHLORIDE 10 ML: 10 INJECTION, SOLUTION INFILTRATION; PERINEURAL at 10:11

## 2022-11-15 ENCOUNTER — CLINICAL SUPPORT (OUTPATIENT)
Dept: PRIMARY CARE CLINIC | Facility: CLINIC | Age: 22
End: 2022-11-15
Payer: COMMERCIAL

## 2022-11-15 DIAGNOSIS — Z23 NEED FOR HEPATITIS A IMMUNIZATION: ICD-10-CM

## 2022-11-15 PROCEDURE — 99999 PR PBB SHADOW E&M-EST. PATIENT-LVL I: ICD-10-PCS | Mod: PBBFAC,,,

## 2022-11-15 PROCEDURE — 90632 HEPATITIS A VACCINE ADULT IM: ICD-10-PCS | Mod: S$GLB,,, | Performed by: FAMILY MEDICINE

## 2022-11-15 PROCEDURE — 90471 HEPATITIS A VACCINE ADULT IM: ICD-10-PCS | Mod: S$GLB,,, | Performed by: FAMILY MEDICINE

## 2022-11-15 PROCEDURE — 90471 IMMUNIZATION ADMIN: CPT | Mod: S$GLB,,, | Performed by: FAMILY MEDICINE

## 2022-11-15 PROCEDURE — 99999 PR PBB SHADOW E&M-EST. PATIENT-LVL I: CPT | Mod: PBBFAC,,,

## 2022-11-15 PROCEDURE — 90632 HEPA VACCINE ADULT IM: CPT | Mod: S$GLB,,, | Performed by: FAMILY MEDICINE

## 2022-11-16 NOTE — PROGRESS NOTES
Orthopaedic Follow-Up Visit    Last Appointment: 10/14/22  Diagnosis: S/p right hip arthroscopy, labral repair, femoroplasty, acetabuloplasty, and capsular closure, onset of groin pain concerning for labral tear  Prior Procedure: MR Arthrogram    Huber Mahajan is a 21 y.o. male who is here for f/u evaluation of his right hip. The patient was last seen here by me on 10/14/22 at which point we decided to send him an MR Arthrogram prior to considering further treatment options. The patient returns today to review his MRI and discuss other treatment options.      To review his history, patient is nearly 11 months out from his right hip arthroscopy, labral repair, femoroplasty, acetabuloplasty, and capsular closure. He had recent onset of groin pain concerning for labral tear. An MR Arthrogram was ordered to further evaluate.     Patient's medications, allergies, past medical, surgical, social and family histories were reviewed and updated as appropriate.    Review of Systems   All systems reviewed were negative.  Specifically, the patient denies fever, chills, weight loss, chest pain, shortness of breath, or dyspnea on exertion.      Past Medical History:   Diagnosis Date    Complex partial seizure disorder 11/01/2012    ANAT (generalized anxiety disorder)        Objective:      Physical Exam  Patient is alert and oriented, no distress. Skin is intact. Neuro is normal with no focal motor or sensory findings.    Standing examination  - stance: unremarkable posture and alignment  - gait: normal     Hip examination     ROM RIGHT LEFT   Flexion 110 110   ER at 90° 60 60   IR at 90° 30 30      Full knee and ankle ROM.    Strength RIGHT LEFT   Flexion 5 5   Abduction 5 5   Adduction 5 5         Tenderness RIGHT LEFT   Hip flexor - -   Adductors - -   Lower abdomen - -   Trochanter - -   Symphysis - -   Other          Tests RIGHT LEFT   Log roll - -   [facility] - -   ADOLFO + -   HERRERA (pain) - -   HERRERA (height) - -   Yordan  - -   Resisted situp - -   Other           Neurovascular exam  - motor function grossly intact bilaterally to hip flexion, knee extension and flexion, ankle dorsiflexion and plantarflexion  - sensation intact to light touch bilaterally to femoral, tibial, tibial and peroneal distributions  - symmetrical pedal pulses    Imaging:   XR Results:  Results for orders placed during the hospital encounter of 10/14/22    X-Ray Hips Bilateral 2 View Incl AP Pelvis    Narrative  EXAMINATION:  XR HIPS BILATERAL 2 VIEW INCL AP PELVIS    INDICATION:  Other sprain of right hip, initial encounter    COMPARISON:  Pelvic radiographs from 10/15/2021    FINDINGS:  AP view of the pelvis obtained with AP and oblique views of both hips.  Bone mineralization is normal.  There is no fracture.  Alignment is anatomic.  Contours of the femoral heads are preserved.  SI joints are intact.  Intact pubic symphysis.  Unremarkable soft tissues.    Impression  1. No evidence of acute injury to the pelvis.      Electronically signed by: Baldo Wood MD  Date:    10/14/2022  Time:    13:21      MRI Results:  Results for orders placed during the hospital encounter of 11/11/22    MRI Arthrogram Hip With Contrast Right    Narrative  EXAMINATION:  MRI ARTHROGRAM HIP RIGHT    CLINICAL HISTORY:  Hip pain, chronic, labral tear suspected, xray done;  Pain in unspecified hip    TECHNIQUE:  Multiplanar/multisequence MRI of the right hip was performed following injection of intra-articular gadolinium.    COMPARISON:  11/12/2021    FINDINGS:  Bone:  No evidence of fracture or marrow replacement process. No evidence of avascular necrosis.   The femoral head demonstrates normal contour.    Labrum: Anterosuperior labrum appears diminutive with irregular surface contours at the site of a previously described tear.  The findings are likely largely represent postoperative change although a small surface tear would be difficult to exclude.  No definite contrast  undercutting seen at the base of the labrum in this region.  Some susceptibility artifact seen in the underlying acetabulum at this region, correlates with a surgical anchor.  There is some persistent smooth shallow contrast undercutting seen throughout the posterosuperior labrum which is unchanged from prior and may potentially represent a sulcus.  Correlate with previous arthroscopic findings.    Cartilage: Articular cartilage appears well maintained.    Tendons: Hip adductor, hamstring and iliopsoas tendons are unremarkable.    Miscellaneous: Limited evaluation of pelvic soft tissues is unremarkable    Impression  1. Appearance of the acetabular labrum as detailed above.      Electronically signed by: Yemi James MD  Date:    11/11/2022  Time:    13:30      CT Results:  No results found for this or any previous visit.          Physician Read: I agree with the above impression.***    Assessment/Plan:   Assessment:  Huber Mahajan is a 21 y.o. male with ***    Plan:    ***  Follow-up ***        Baldo Davis MD    I, Fabio Perez, acted as a scribe for Baldo Davis MD for the duration of this office visit.

## 2022-11-18 ENCOUNTER — OFFICE VISIT (OUTPATIENT)
Dept: ORTHOPEDICS | Facility: CLINIC | Age: 22
End: 2022-11-18
Payer: COMMERCIAL

## 2022-11-18 VITALS — HEIGHT: 72 IN | BODY MASS INDEX: 26.14 KG/M2 | WEIGHT: 193 LBS

## 2022-11-18 DIAGNOSIS — M25.851 FEMOROACETABULAR IMPINGEMENT OF RIGHT HIP: Primary | ICD-10-CM

## 2022-11-18 PROCEDURE — 1159F PR MEDICATION LIST DOCUMENTED IN MEDICAL RECORD: ICD-10-PCS | Mod: CPTII,S$GLB,, | Performed by: STUDENT IN AN ORGANIZED HEALTH CARE EDUCATION/TRAINING PROGRAM

## 2022-11-18 PROCEDURE — 3008F PR BODY MASS INDEX (BMI) DOCUMENTED: ICD-10-PCS | Mod: CPTII,S$GLB,, | Performed by: STUDENT IN AN ORGANIZED HEALTH CARE EDUCATION/TRAINING PROGRAM

## 2022-11-18 PROCEDURE — 99214 OFFICE O/P EST MOD 30 MIN: CPT | Mod: S$GLB,,, | Performed by: STUDENT IN AN ORGANIZED HEALTH CARE EDUCATION/TRAINING PROGRAM

## 2022-11-18 PROCEDURE — 1160F RVW MEDS BY RX/DR IN RCRD: CPT | Mod: CPTII,S$GLB,, | Performed by: STUDENT IN AN ORGANIZED HEALTH CARE EDUCATION/TRAINING PROGRAM

## 2022-11-18 PROCEDURE — 99999 PR PBB SHADOW E&M-EST. PATIENT-LVL III: CPT | Mod: PBBFAC,,, | Performed by: STUDENT IN AN ORGANIZED HEALTH CARE EDUCATION/TRAINING PROGRAM

## 2022-11-18 PROCEDURE — 3008F BODY MASS INDEX DOCD: CPT | Mod: CPTII,S$GLB,, | Performed by: STUDENT IN AN ORGANIZED HEALTH CARE EDUCATION/TRAINING PROGRAM

## 2022-11-18 PROCEDURE — 1160F PR REVIEW ALL MEDS BY PRESCRIBER/CLIN PHARMACIST DOCUMENTED: ICD-10-PCS | Mod: CPTII,S$GLB,, | Performed by: STUDENT IN AN ORGANIZED HEALTH CARE EDUCATION/TRAINING PROGRAM

## 2022-11-18 PROCEDURE — 99999 PR PBB SHADOW E&M-EST. PATIENT-LVL III: ICD-10-PCS | Mod: PBBFAC,,, | Performed by: STUDENT IN AN ORGANIZED HEALTH CARE EDUCATION/TRAINING PROGRAM

## 2022-11-18 PROCEDURE — 3044F PR MOST RECENT HEMOGLOBIN A1C LEVEL <7.0%: ICD-10-PCS | Mod: CPTII,S$GLB,, | Performed by: STUDENT IN AN ORGANIZED HEALTH CARE EDUCATION/TRAINING PROGRAM

## 2022-11-18 PROCEDURE — 99214 PR OFFICE/OUTPT VISIT, EST, LEVL IV, 30-39 MIN: ICD-10-PCS | Mod: S$GLB,,, | Performed by: STUDENT IN AN ORGANIZED HEALTH CARE EDUCATION/TRAINING PROGRAM

## 2022-11-18 PROCEDURE — 1159F MED LIST DOCD IN RCRD: CPT | Mod: CPTII,S$GLB,, | Performed by: STUDENT IN AN ORGANIZED HEALTH CARE EDUCATION/TRAINING PROGRAM

## 2022-11-18 PROCEDURE — 3044F HG A1C LEVEL LT 7.0%: CPT | Mod: CPTII,S$GLB,, | Performed by: STUDENT IN AN ORGANIZED HEALTH CARE EDUCATION/TRAINING PROGRAM

## 2022-11-18 NOTE — PROGRESS NOTES
Orthopaedic Follow-Up Visit    Last Appointment: 10/14/22  Diagnosis: S/p right hip arthroscopy, labral repair, femoroplasty, acetabuloplasty, and capsular closure, onset of groin pain concerning for labral tear  Prior Procedure: MR Arthrogram    Huber Mahajan is a 21 y.o. male who is here for f/u evaluation of his right hip. The patient was last seen here by me on 10/14/22 at which point we decided to send him an MR Arthrogram prior to considering further treatment options. The patient returns today to review his MRI and discuss other treatment options.      To review his history, patient is nearly 11 months out from his right hip arthroscopy, labral repair, femoroplasty, acetabuloplasty, and capsular closure. He had recent onset of groin pain concerning for labral tear. An MR Arthrogram was ordered to further evaluate.     Patient's medications, allergies, past medical, surgical, social and family histories were reviewed and updated as appropriate.    Review of Systems   All systems reviewed were negative.  Specifically, the patient denies fever, chills, weight loss, chest pain, shortness of breath, or dyspnea on exertion.      Past Medical History:   Diagnosis Date    Complex partial seizure disorder 11/01/2012    ANAT (generalized anxiety disorder)        Objective:      Physical Exam  Patient is alert and oriented, no distress. Skin is intact. Neuro is normal with no focal motor or sensory findings.    Standing examination  - stance: unremarkable posture and alignment  - gait: normal     Hip examination     ROM RIGHT LEFT   Flexion 110 110   ER at 90° 60 60   IR at 90° 30 30      Full knee and ankle ROM.    Strength RIGHT LEFT   Flexion 5 5   Abduction 5 5   Adduction 5 5         Tenderness RIGHT LEFT   Hip flexor - -   Adductors - -   Lower abdomen - -   Trochanter - -   Symphysis - -   Other          Tests RIGHT LEFT   Log roll - -   [facility] - -   ADOLFO + -   HERRERA (pain) - -   HERRERA (height) - -   Yordan  - -   Resisted situp - -   Other           Neurovascular exam  - motor function grossly intact bilaterally to hip flexion, knee extension and flexion, ankle dorsiflexion and plantarflexion  - sensation intact to light touch bilaterally to femoral, tibial, tibial and peroneal distributions  - symmetrical pedal pulses    Imaging:   XR Results:  Results for orders placed during the hospital encounter of 10/14/22    X-Ray Hips Bilateral 2 View Incl AP Pelvis    Narrative  EXAMINATION:  XR HIPS BILATERAL 2 VIEW INCL AP PELVIS    INDICATION:  Other sprain of right hip, initial encounter    COMPARISON:  Pelvic radiographs from 10/15/2021    FINDINGS:  AP view of the pelvis obtained with AP and oblique views of both hips.  Bone mineralization is normal.  There is no fracture.  Alignment is anatomic.  Contours of the femoral heads are preserved.  SI joints are intact.  Intact pubic symphysis.  Unremarkable soft tissues.    Impression  1. No evidence of acute injury to the pelvis.      Electronically signed by: Baldo Wood MD  Date:    10/14/2022  Time:    13:21      MRI Results:  Results for orders placed during the hospital encounter of 11/11/22    MRI Arthrogram Hip With Contrast Right    Narrative  EXAMINATION:  MRI ARTHROGRAM HIP RIGHT    CLINICAL HISTORY:  Hip pain, chronic, labral tear suspected, xray done;  Pain in unspecified hip    TECHNIQUE:  Multiplanar/multisequence MRI of the right hip was performed following injection of intra-articular gadolinium.    COMPARISON:  11/12/2021    FINDINGS:  Bone:  No evidence of fracture or marrow replacement process. No evidence of avascular necrosis.   The femoral head demonstrates normal contour.    Labrum: Anterosuperior labrum appears diminutive with irregular surface contours at the site of a previously described tear.  The findings are likely largely represent postoperative change although a small surface tear would be difficult to exclude.  No definite contrast  undercutting seen at the base of the labrum in this region.  Some susceptibility artifact seen in the underlying acetabulum at this region, correlates with a surgical anchor.  There is some persistent smooth shallow contrast undercutting seen throughout the posterosuperior labrum which is unchanged from prior and may potentially represent a sulcus.  Correlate with previous arthroscopic findings.    Cartilage: Articular cartilage appears well maintained.    Tendons: Hip adductor, hamstring and iliopsoas tendons are unremarkable.    Miscellaneous: Limited evaluation of pelvic soft tissues is unremarkable    Impression  1. Appearance of the acetabular labrum as detailed above.      Electronically signed by: Yemi James MD  Date:    11/11/2022  Time:    13:30      CT Results:  No results found for this or any previous visit.          Physician Read: I agree with the above impression.    Assessment/Plan:   Assessment:  Huber Mahajan is a 21 y.o. male with right hip pain after previous labral repair    Plan:    We discussed the results of his MRIa of his right hip.  Anterosuperior labral tear appears healed and the capsule appears healed as well. We went over the posterior sulcus and I showed him that area on the arthroscopic images.  I reassured him that there is no recurrent labral tear and his femoral resection appears adequate. I would like to administer CSI into the R hip and restart his PT to get him back to the activities he enjoys.  Referral placed for an ultrasound guided hip injection with Dr. Martinez.    Referral also placed for PT for R hip, return to sport program  Follow-up 2 months for recheck        Baldo Davis MD    I, Fabio Perez, acted as a scribe for Baldo Davis MD for the duration of this office visit.

## 2022-12-02 ENCOUNTER — OFFICE VISIT (OUTPATIENT)
Dept: ORTHOPEDICS | Facility: CLINIC | Age: 22
End: 2022-12-02
Payer: COMMERCIAL

## 2022-12-02 VITALS — BODY MASS INDEX: 27.06 KG/M2 | WEIGHT: 199.5 LBS

## 2022-12-02 DIAGNOSIS — M25.559 PAIN IN UNSPECIFIED HIP: ICD-10-CM

## 2022-12-02 DIAGNOSIS — S73.191D TEAR OF RIGHT ACETABULAR LABRUM, SUBSEQUENT ENCOUNTER: ICD-10-CM

## 2022-12-02 DIAGNOSIS — M25.851 FEMOROACETABULAR IMPINGEMENT OF RIGHT HIP: Primary | ICD-10-CM

## 2022-12-02 PROCEDURE — 1160F PR REVIEW ALL MEDS BY PRESCRIBER/CLIN PHARMACIST DOCUMENTED: ICD-10-PCS | Mod: CPTII,S$GLB,, | Performed by: STUDENT IN AN ORGANIZED HEALTH CARE EDUCATION/TRAINING PROGRAM

## 2022-12-02 PROCEDURE — 1159F MED LIST DOCD IN RCRD: CPT | Mod: CPTII,S$GLB,, | Performed by: STUDENT IN AN ORGANIZED HEALTH CARE EDUCATION/TRAINING PROGRAM

## 2022-12-02 PROCEDURE — 99999 PR PBB SHADOW E&M-EST. PATIENT-LVL III: CPT | Mod: PBBFAC,,, | Performed by: STUDENT IN AN ORGANIZED HEALTH CARE EDUCATION/TRAINING PROGRAM

## 2022-12-02 PROCEDURE — 1160F RVW MEDS BY RX/DR IN RCRD: CPT | Mod: CPTII,S$GLB,, | Performed by: STUDENT IN AN ORGANIZED HEALTH CARE EDUCATION/TRAINING PROGRAM

## 2022-12-02 PROCEDURE — 1159F PR MEDICATION LIST DOCUMENTED IN MEDICAL RECORD: ICD-10-PCS | Mod: CPTII,S$GLB,, | Performed by: STUDENT IN AN ORGANIZED HEALTH CARE EDUCATION/TRAINING PROGRAM

## 2022-12-02 PROCEDURE — 99499 UNLISTED E&M SERVICE: CPT | Mod: S$GLB,,, | Performed by: STUDENT IN AN ORGANIZED HEALTH CARE EDUCATION/TRAINING PROGRAM

## 2022-12-02 PROCEDURE — 3008F PR BODY MASS INDEX (BMI) DOCUMENTED: ICD-10-PCS | Mod: CPTII,S$GLB,, | Performed by: STUDENT IN AN ORGANIZED HEALTH CARE EDUCATION/TRAINING PROGRAM

## 2022-12-02 PROCEDURE — 3008F BODY MASS INDEX DOCD: CPT | Mod: CPTII,S$GLB,, | Performed by: STUDENT IN AN ORGANIZED HEALTH CARE EDUCATION/TRAINING PROGRAM

## 2022-12-02 PROCEDURE — 3044F PR MOST RECENT HEMOGLOBIN A1C LEVEL <7.0%: ICD-10-PCS | Mod: CPTII,S$GLB,, | Performed by: STUDENT IN AN ORGANIZED HEALTH CARE EDUCATION/TRAINING PROGRAM

## 2022-12-02 PROCEDURE — 99499 NO LOS: ICD-10-PCS | Mod: S$GLB,,, | Performed by: STUDENT IN AN ORGANIZED HEALTH CARE EDUCATION/TRAINING PROGRAM

## 2022-12-02 PROCEDURE — 20611 LARGE JOINT ASPIRATION/INJECTION: R HIP JOINT: ICD-10-PCS | Mod: RT,S$GLB,, | Performed by: STUDENT IN AN ORGANIZED HEALTH CARE EDUCATION/TRAINING PROGRAM

## 2022-12-02 PROCEDURE — 99999 PR PBB SHADOW E&M-EST. PATIENT-LVL III: ICD-10-PCS | Mod: PBBFAC,,, | Performed by: STUDENT IN AN ORGANIZED HEALTH CARE EDUCATION/TRAINING PROGRAM

## 2022-12-02 PROCEDURE — 20611 DRAIN/INJ JOINT/BURSA W/US: CPT | Mod: RT,S$GLB,, | Performed by: STUDENT IN AN ORGANIZED HEALTH CARE EDUCATION/TRAINING PROGRAM

## 2022-12-02 PROCEDURE — 3044F HG A1C LEVEL LT 7.0%: CPT | Mod: CPTII,S$GLB,, | Performed by: STUDENT IN AN ORGANIZED HEALTH CARE EDUCATION/TRAINING PROGRAM

## 2022-12-02 RX ORDER — TRIAMCINOLONE ACETONIDE 40 MG/ML
40 INJECTION, SUSPENSION INTRA-ARTICULAR; INTRAMUSCULAR
Status: DISCONTINUED | OUTPATIENT
Start: 2022-12-02 | End: 2022-12-02 | Stop reason: HOSPADM

## 2022-12-02 RX ADMIN — TRIAMCINOLONE ACETONIDE 40 MG: 40 INJECTION, SUSPENSION INTRA-ARTICULAR; INTRAMUSCULAR at 11:12

## 2022-12-02 NOTE — PROCEDURES
Large Joint Aspiration/Injection: R hip joint    Date/Time: 12/2/2022 11:00 AM  Performed by: Graeme Martinez MD  Authorized by: Graeme Martinez MD     Consent Done?:  Yes (Verbal)  Indications:  Pain, diagnostic evaluation and arthritis  Site marked: the procedure site was marked    Timeout: prior to procedure the correct patient, procedure, and site was verified    Prep: patient was prepped and draped in usual sterile fashion      Local anesthesia used?: Yes    Anesthesia:  Local infiltration  Local anesthetic:  Bupivacaine 0.5% without epinephrine, lidocaine 1% without epinephrine and topical anesthetic  Anesthetic total (ml):  4      Details:  Needle Size:  22 G  Ultrasonic Guidance for needle placement?: Yes    Images are saved and documented.  Approach:  Anterior  Location:  Hip  Site:  R hip joint  Medications:  40 mg triamcinolone acetonide 40 mg/mL  Patient tolerance:  Patient tolerated the procedure well with no immediate complications     Ultrasound guidance was used for needle localization. Images were saved and stored for documentation. The appropriate structures were visualized. Dynamic visualization of the needle was continuous throughout the procedures and maintained good position.     We discussed the proper protocols after the injection such as no submerging pools, baths tubs, or hot tubs for 48 hr.  Showering is okay today.  We also discussed that blood sugars can be elevated after an injection and asked patient to properly check their sugars over the next few days and contact their PCP if there are any concerns.  We discussed red flags such as fevers, chills, red, warm, tender joint at the area of injection to please seek medical care immediately.

## 2023-04-30 ENCOUNTER — PATIENT MESSAGE (OUTPATIENT)
Dept: PRIMARY CARE CLINIC | Facility: CLINIC | Age: 23
End: 2023-04-30
Payer: COMMERCIAL

## 2023-05-01 RX ORDER — SCOLOPAMINE TRANSDERMAL SYSTEM 1 MG/1
1 PATCH, EXTENDED RELEASE TRANSDERMAL
Qty: 3 PATCH | Refills: 2 | Status: SHIPPED | OUTPATIENT
Start: 2023-05-01 | End: 2023-05-07 | Stop reason: SDUPTHER

## 2023-05-07 ENCOUNTER — PATIENT MESSAGE (OUTPATIENT)
Dept: PRIMARY CARE CLINIC | Facility: CLINIC | Age: 23
End: 2023-05-07
Payer: COMMERCIAL

## 2023-05-08 RX ORDER — SCOLOPAMINE TRANSDERMAL SYSTEM 1 MG/1
1 PATCH, EXTENDED RELEASE TRANSDERMAL
Qty: 3 PATCH | Refills: 2 | Status: SHIPPED | OUTPATIENT
Start: 2023-05-08

## 2023-08-18 ENCOUNTER — OFFICE VISIT (OUTPATIENT)
Dept: SPORTS MEDICINE | Facility: CLINIC | Age: 23
End: 2023-08-18
Payer: COMMERCIAL

## 2023-08-18 VITALS — HEIGHT: 72 IN | BODY MASS INDEX: 26.95 KG/M2 | WEIGHT: 199 LBS

## 2023-08-18 DIAGNOSIS — M25.851 FEMOROACETABULAR IMPINGEMENT OF RIGHT HIP: Primary | ICD-10-CM

## 2023-08-18 DIAGNOSIS — M24.851 SNAPPING HIP SYNDROME, RIGHT: ICD-10-CM

## 2023-08-18 PROCEDURE — 3008F PR BODY MASS INDEX (BMI) DOCUMENTED: ICD-10-PCS | Mod: CPTII,S$GLB,, | Performed by: STUDENT IN AN ORGANIZED HEALTH CARE EDUCATION/TRAINING PROGRAM

## 2023-08-18 PROCEDURE — 1159F MED LIST DOCD IN RCRD: CPT | Mod: CPTII,S$GLB,, | Performed by: STUDENT IN AN ORGANIZED HEALTH CARE EDUCATION/TRAINING PROGRAM

## 2023-08-18 PROCEDURE — 99999 PR PBB SHADOW E&M-EST. PATIENT-LVL III: CPT | Mod: PBBFAC,,, | Performed by: STUDENT IN AN ORGANIZED HEALTH CARE EDUCATION/TRAINING PROGRAM

## 2023-08-18 PROCEDURE — 99999 PR PBB SHADOW E&M-EST. PATIENT-LVL III: ICD-10-PCS | Mod: PBBFAC,,, | Performed by: STUDENT IN AN ORGANIZED HEALTH CARE EDUCATION/TRAINING PROGRAM

## 2023-08-18 PROCEDURE — 1160F RVW MEDS BY RX/DR IN RCRD: CPT | Mod: CPTII,S$GLB,, | Performed by: STUDENT IN AN ORGANIZED HEALTH CARE EDUCATION/TRAINING PROGRAM

## 2023-08-18 PROCEDURE — 1159F PR MEDICATION LIST DOCUMENTED IN MEDICAL RECORD: ICD-10-PCS | Mod: CPTII,S$GLB,, | Performed by: STUDENT IN AN ORGANIZED HEALTH CARE EDUCATION/TRAINING PROGRAM

## 2023-08-18 PROCEDURE — 99213 OFFICE O/P EST LOW 20 MIN: CPT | Mod: S$GLB,,, | Performed by: STUDENT IN AN ORGANIZED HEALTH CARE EDUCATION/TRAINING PROGRAM

## 2023-08-18 PROCEDURE — 99213 PR OFFICE/OUTPT VISIT, EST, LEVL III, 20-29 MIN: ICD-10-PCS | Mod: S$GLB,,, | Performed by: STUDENT IN AN ORGANIZED HEALTH CARE EDUCATION/TRAINING PROGRAM

## 2023-08-18 PROCEDURE — 1160F PR REVIEW ALL MEDS BY PRESCRIBER/CLIN PHARMACIST DOCUMENTED: ICD-10-PCS | Mod: CPTII,S$GLB,, | Performed by: STUDENT IN AN ORGANIZED HEALTH CARE EDUCATION/TRAINING PROGRAM

## 2023-08-18 PROCEDURE — 3008F BODY MASS INDEX DOCD: CPT | Mod: CPTII,S$GLB,, | Performed by: STUDENT IN AN ORGANIZED HEALTH CARE EDUCATION/TRAINING PROGRAM

## 2023-08-18 NOTE — PROGRESS NOTES
Orthopaedic Follow-Up Visit    Last Appointment: 11/18/22  Diagnosis: S/p right hip arthroscopy, labral repair, femoroplasty, acetabuloplasty, and capsular closure, ongoing pain/feeling of instability  Prior Procedure: MR Arthrogram    Huber Mahajan is a 22 y.o. male who is here for f/u evaluation of his right hip. The patient was last seen here by me on 11/18/22 at which point we reviewed his MR Arthrogram and discussed that his anterosuperior labral tear appeared healed and the capsule appeared healed as well. We went over the posterior sulcus and I showed him that area on the arthroscopic images. I reassured him that there was no recurrent labral tear and his femoral resection appeared adequate. We elected to proceed with US guided CSI and referral to physical therapy for progression on return to sport. The patient returns today reporting that his symptoms did not improve much after injection. Discomfort is worse when walking, standing, and he states that sometimes it feels like it wants to pop out of socket.      To review his history, Huber Mahajan is a 22 y.o. male who is s/p right hip arthroscopy, labral repair, femoroplasty, acetabuloplasty, and capsular closure on 12/20/21. He had progressed well following surgery but returned for follow-up on 10/14/22 and reported recent onset of groin pain concerning for labral tear, so we sent him for an MR Arthrogram for further evaluation.  An MR Arthrogram was ordered to further evaluate.     Patient's medications, allergies, past medical, surgical, social and family histories were reviewed and updated as appropriate.    Review of Systems   All systems reviewed were negative.  Specifically, the patient denies fever, chills, weight loss, chest pain, shortness of breath, or dyspnea on exertion.      Past Medical History:   Diagnosis Date    Complex partial seizure disorder 11/01/2012    ANAT (generalized anxiety disorder)        Objective:      Physical Exam  Patient  is alert and oriented, no distress. Skin is intact. Neuro is normal with no focal motor or sensory findings.    Standing examination  - stance: unremarkable posture and alignment  - gait: normal     Hip examination     ROM RIGHT LEFT   Flexion 105 105   ER at 90° 60 60   IR at 90° 30 30      Full knee and ankle ROM.    Strength RIGHT LEFT   Flexion 5 5   Abduction 5 5   Adduction 5 5         Tenderness RIGHT LEFT   Hip flexor + -   Adductors - -   Lower abdomen - -   Trochanter - -   Symphysis - -   Other          Tests RIGHT LEFT   Log roll - -   [facility] - -   FADDIR + -   HERRERA (pain) + -   HERRERA (height) - -   Yordan - -   Resisted situp - -   Other           Neurovascular exam  - motor function grossly intact bilaterally to hip flexion, knee extension and flexion, ankle dorsiflexion and plantarflexion  - sensation intact to light touch bilaterally to femoral, tibial, tibial and peroneal distributions  - symmetrical pedal pulses    Imaging:   XR Results:  Results for orders placed during the hospital encounter of 10/14/22    X-Ray Hips Bilateral 2 View Incl AP Pelvis    Narrative  EXAMINATION:  XR HIPS BILATERAL 2 VIEW INCL AP PELVIS    INDICATION:  Other sprain of right hip, initial encounter    COMPARISON:  Pelvic radiographs from 10/15/2021    FINDINGS:  AP view of the pelvis obtained with AP and oblique views of both hips.  Bone mineralization is normal.  There is no fracture.  Alignment is anatomic.  Contours of the femoral heads are preserved.  SI joints are intact.  Intact pubic symphysis.  Unremarkable soft tissues.    Impression  1. No evidence of acute injury to the pelvis.      Electronically signed by: Baldo Wood MD  Date:    10/14/2022  Time:    13:21      MRI Results:  Results for orders placed during the hospital encounter of 11/11/22    MRI Arthrogram Hip With Contrast Right    Narrative  EXAMINATION:  MRI ARTHROGRAM HIP RIGHT    CLINICAL HISTORY:  Hip pain, chronic, labral tear  suspected, xray done;  Pain in unspecified hip    TECHNIQUE:  Multiplanar/multisequence MRI of the right hip was performed following injection of intra-articular gadolinium.    COMPARISON:  11/12/2021    FINDINGS:  Bone:  No evidence of fracture or marrow replacement process. No evidence of avascular necrosis.   The femoral head demonstrates normal contour.    Labrum: Anterosuperior labrum appears diminutive with irregular surface contours at the site of a previously described tear.  The findings are likely largely represent postoperative change although a small surface tear would be difficult to exclude.  No definite contrast undercutting seen at the base of the labrum in this region.  Some susceptibility artifact seen in the underlying acetabulum at this region, correlates with a surgical anchor.  There is some persistent smooth shallow contrast undercutting seen throughout the posterosuperior labrum which is unchanged from prior and may potentially represent a sulcus.  Correlate with previous arthroscopic findings.    Cartilage: Articular cartilage appears well maintained.    Tendons: Hip adductor, hamstring and iliopsoas tendons are unremarkable.    Miscellaneous: Limited evaluation of pelvic soft tissues is unremarkable    Impression  1. Appearance of the acetabular labrum as detailed above.      Electronically signed by: Yemi James MD  Date:    11/11/2022  Time:    13:30      CT Results:  No results found for this or any previous visit.          Physician Read: I agree with the above impression.    Assessment/Plan:   Assessment:  Huber Mahajan is a 22 y.o. male with right hip pain after previous labral repair    Plan:    Discussed diagnosis and treatment options with him and his mom today. He has continued feeling of instability with certain activity and intermittent popping. Popping could be related to internal snapping hip or scar tissue around the capsule.  He has tried extensive non-operative  treatment including rest, activity modification, PT, and CSI without significant improvement. We went over the possibility of PRP injection versus repeat arthroscopy with the potential for revision repair and possible capsular plication.   Before committing to revision hip arthroscopy, I would like to discuss his imaging with a colleague and will potentially send him for second opinion evaluation.  Follow-up through [facility].         Baldo Davis MD    I, Lily Reva, acted as a scribe for Baldo Davis MD for the duration of this office visit.

## 2023-10-09 ENCOUNTER — PATIENT MESSAGE (OUTPATIENT)
Dept: PRIMARY CARE CLINIC | Facility: CLINIC | Age: 23
End: 2023-10-09
Payer: COMMERCIAL

## 2023-11-13 ENCOUNTER — OFFICE VISIT (OUTPATIENT)
Dept: PRIMARY CARE CLINIC | Facility: CLINIC | Age: 23
End: 2023-11-13
Payer: COMMERCIAL

## 2023-11-13 VITALS
DIASTOLIC BLOOD PRESSURE: 62 MMHG | BODY MASS INDEX: 27.09 KG/M2 | TEMPERATURE: 96 F | OXYGEN SATURATION: 98 % | SYSTOLIC BLOOD PRESSURE: 106 MMHG | WEIGHT: 199.75 LBS | HEART RATE: 52 BPM

## 2023-11-13 DIAGNOSIS — Z00.00 ROUTINE GENERAL MEDICAL EXAMINATION AT A HEALTH CARE FACILITY: Primary | ICD-10-CM

## 2023-11-13 PROCEDURE — 3074F PR MOST RECENT SYSTOLIC BLOOD PRESSURE < 130 MM HG: ICD-10-PCS | Mod: CPTII,S$GLB,, | Performed by: FAMILY MEDICINE

## 2023-11-13 PROCEDURE — 99999 PR PBB SHADOW E&M-EST. PATIENT-LVL III: CPT | Mod: PBBFAC,,, | Performed by: FAMILY MEDICINE

## 2023-11-13 PROCEDURE — 3008F BODY MASS INDEX DOCD: CPT | Mod: CPTII,S$GLB,, | Performed by: FAMILY MEDICINE

## 2023-11-13 PROCEDURE — 1160F PR REVIEW ALL MEDS BY PRESCRIBER/CLIN PHARMACIST DOCUMENTED: ICD-10-PCS | Mod: CPTII,S$GLB,, | Performed by: FAMILY MEDICINE

## 2023-11-13 PROCEDURE — 1160F RVW MEDS BY RX/DR IN RCRD: CPT | Mod: CPTII,S$GLB,, | Performed by: FAMILY MEDICINE

## 2023-11-13 PROCEDURE — 3078F PR MOST RECENT DIASTOLIC BLOOD PRESSURE < 80 MM HG: ICD-10-PCS | Mod: CPTII,S$GLB,, | Performed by: FAMILY MEDICINE

## 2023-11-13 PROCEDURE — 3074F SYST BP LT 130 MM HG: CPT | Mod: CPTII,S$GLB,, | Performed by: FAMILY MEDICINE

## 2023-11-13 PROCEDURE — 99999 PR PBB SHADOW E&M-EST. PATIENT-LVL III: ICD-10-PCS | Mod: PBBFAC,,, | Performed by: FAMILY MEDICINE

## 2023-11-13 PROCEDURE — 1159F MED LIST DOCD IN RCRD: CPT | Mod: CPTII,S$GLB,, | Performed by: FAMILY MEDICINE

## 2023-11-13 PROCEDURE — 3078F DIAST BP <80 MM HG: CPT | Mod: CPTII,S$GLB,, | Performed by: FAMILY MEDICINE

## 2023-11-13 PROCEDURE — 3008F PR BODY MASS INDEX (BMI) DOCUMENTED: ICD-10-PCS | Mod: CPTII,S$GLB,, | Performed by: FAMILY MEDICINE

## 2023-11-13 PROCEDURE — 99395 PR PREVENTIVE VISIT,EST,18-39: ICD-10-PCS | Mod: S$GLB,,, | Performed by: FAMILY MEDICINE

## 2023-11-13 PROCEDURE — 1159F PR MEDICATION LIST DOCUMENTED IN MEDICAL RECORD: ICD-10-PCS | Mod: CPTII,S$GLB,, | Performed by: FAMILY MEDICINE

## 2023-11-13 PROCEDURE — 99395 PREV VISIT EST AGE 18-39: CPT | Mod: S$GLB,,, | Performed by: FAMILY MEDICINE

## 2023-11-13 NOTE — PROGRESS NOTES
Subjective:      Patient ID: Huber Mahajan is a 22 y.o. male.    Chief Complaint: Annual Exam      Patient is a 22 y.o. male coming in today for annual exam.   He is overall feeling well. Currently working and going to school at the same time. Pt uses motion sickness patches during flights for nausea prevention. He is due for flu vaccine. Reports linda bowel movements. Denies insomnia at this time. BP is stable in clinic. Denies being sexually active at this time. No other health concern at this time.       1. Routine general medical examination at a health care facility       No questionnaires on file.    Pmh, Psh, Family Hx, Social Hx, HM updated in Epic Tabs today.   Review of Systems   Constitutional:  Negative for activity change, appetite change, chills, fatigue and unexpected weight change.   HENT:  Negative for congestion, ear pain, postnasal drip, sneezing, sore throat and trouble swallowing.    Eyes:  Negative for pain and visual disturbance.   Respiratory:  Negative for cough and shortness of breath.    Cardiovascular:  Negative for chest pain and leg swelling.   Gastrointestinal:  Negative for abdominal pain, constipation, diarrhea, nausea and vomiting.   Endocrine: Negative for cold intolerance and heat intolerance.   Genitourinary:  Negative for difficulty urinating, dysuria and flank pain.   Musculoskeletal:  Negative for arthralgias, back pain, joint swelling and neck pain.   Skin:  Negative for color change and rash.   Neurological:  Negative for dizziness, seizures and headaches.   Psychiatric/Behavioral:  Negative for behavioral problems, dysphoric mood and sleep disturbance. The patient is not nervous/anxious.      Objective:     Vitals:    11/13/23 0753   BP: 106/62   Pulse: (!) 52   Temp: 96.4 °F (35.8 °C)   SpO2: 98%   Weight: 90.6 kg (199 lb 11.8 oz)     Wt Readings from Last 10 Encounters:   11/13/23 90.6 kg (199 lb 11.8 oz)   08/18/23 90.3 kg (199 lb)   12/02/22 90.5 kg (199 lb 8.3 oz)    11/18/22 87.5 kg (193 lb)   11/10/22 87.6 kg (193 lb 2 oz)   10/14/22 90.3 kg (199 lb)   04/08/22 90.3 kg (199 lb)   02/04/22 90.3 kg (199 lb)   01/04/22 90.3 kg (199 lb)   12/20/21 90.4 kg (199 lb 3 oz)     Physical Exam  Vitals reviewed.   Constitutional:       General: He is not in acute distress.     Appearance: Normal appearance. He is well-developed and overweight.   HENT:      Head: Normocephalic and atraumatic.      Right Ear: Tympanic membrane and external ear normal.      Left Ear: Tympanic membrane and external ear normal.      Nose: Nose normal.      Mouth/Throat:      Mouth: Mucous membranes are moist.      Pharynx: Oropharynx is clear.   Eyes:      Conjunctiva/sclera: Conjunctivae normal.      Pupils: Pupils are equal, round, and reactive to light.   Neck:      Thyroid: No thyromegaly.   Cardiovascular:      Rate and Rhythm: Normal rate and regular rhythm.      Heart sounds: No murmur heard.     No friction rub. No gallop.   Pulmonary:      Effort: Pulmonary effort is normal. No respiratory distress.      Breath sounds: Normal breath sounds.   Abdominal:      General: Bowel sounds are normal. There is no distension.      Palpations: Abdomen is soft.      Tenderness: There is no abdominal tenderness. There is no rebound.   Musculoskeletal:         General: Normal range of motion.      Cervical back: Normal range of motion and neck supple.   Lymphadenopathy:      Cervical: No cervical adenopathy.   Skin:     General: Skin is warm and dry.   Neurological:      General: No focal deficit present.      Mental Status: He is alert and oriented to person, place, and time.      Coordination: Coordination normal.   Psychiatric:         Attention and Perception: Attention normal.         Mood and Affect: Mood and affect normal.         Speech: Speech normal.         Behavior: Behavior normal.         Thought Content: Thought content normal.         Cognition and Memory: Cognition normal.         Judgment:  Judgment normal.         Assessment:     1. Routine general medical examination at a health care facility        Plan:   Huber was seen today for annual exam.    Diagnoses and all orders for this visit:    Routine general medical examination at a health care facility      Discussed health maintenance with pt.   He declined lab work and vaccines today.   Instructed to f/u in 1 year.     There are no Patient Instructions on file for this visit.    Follow up in about 1 year (around 11/13/2024) for physical with Dr ROCKWELL.      LABS:   Lab Results   Component Value Date    HGBA1C 5.2 11/10/2022      Lab Results   Component Value Date    CHOL 120 11/10/2022     Lab Results   Component Value Date    LDLCALC 58.2 (L) 11/10/2022     Lab Results   Component Value Date    WBC 7.66 11/10/2022    HGB 15.5 11/10/2022    HCT 47.2 11/10/2022     11/10/2022    CHOL 120 11/10/2022    TRIG 54 11/10/2022    HDL 51 11/10/2022    ALT 42 11/10/2022    AST 41 (H) 11/10/2022     11/10/2022    K 4.1 11/10/2022     11/10/2022    CREATININE 1.0 11/10/2022    BUN 18 11/10/2022    CO2 26 11/10/2022    TSH 2.212 11/10/2022    HGBA1C 5.2 11/10/2022           Scribe Attestation:   I, Ross Wills, am scribing for, and in the presence of, Dr. Meme Rockwell MD. I performed the above scribed service and the documentation accurately describes the services I performed. I attest to the accuracy of the note.    I, Dr. Meme Rockwell MD, reviewed documentation as scribed above. I personally performed the services described in this documentation.  I agree that the record reflects my personal performance and is accurate and complete. Meme Rocwkell MD.    11/13/2023

## 2023-12-19 ENCOUNTER — PATIENT MESSAGE (OUTPATIENT)
Dept: PRIMARY CARE CLINIC | Facility: CLINIC | Age: 23
End: 2023-12-19
Payer: COMMERCIAL

## 2024-06-10 NOTE — TELEPHONE ENCOUNTER
No care due was identified.  [facility] Embedded Care Due Messages. Reference number: [id number].   6/10/2024 5:05:07 PM CDT

## 2024-06-11 RX ORDER — SCOLOPAMINE TRANSDERMAL SYSTEM 1 MG/1
1 PATCH, EXTENDED RELEASE TRANSDERMAL
Qty: 3 PATCH | Refills: 2 | Status: SHIPPED | OUTPATIENT
Start: 2024-06-11

## 2024-11-13 ENCOUNTER — OFFICE VISIT (OUTPATIENT)
Dept: PRIMARY CARE CLINIC | Facility: CLINIC | Age: 24
End: 2024-11-13
Payer: COMMERCIAL

## 2024-11-13 ENCOUNTER — LAB VISIT (OUTPATIENT)
Dept: LAB | Facility: HOSPITAL | Age: 24
End: 2024-11-13
Attending: FAMILY MEDICINE
Payer: COMMERCIAL

## 2024-11-13 VITALS
WEIGHT: 206.88 LBS | HEIGHT: 72 IN | HEART RATE: 69 BPM | DIASTOLIC BLOOD PRESSURE: 68 MMHG | RESPIRATION RATE: 18 BRPM | TEMPERATURE: 98 F | SYSTOLIC BLOOD PRESSURE: 116 MMHG | BODY MASS INDEX: 28.02 KG/M2 | OXYGEN SATURATION: 98 %

## 2024-11-13 DIAGNOSIS — G47.00 INSOMNIA, UNSPECIFIED TYPE: ICD-10-CM

## 2024-11-13 DIAGNOSIS — Z00.00 ROUTINE GENERAL MEDICAL EXAMINATION AT A HEALTH CARE FACILITY: Primary | ICD-10-CM

## 2024-11-13 DIAGNOSIS — G40.209 COMPLEX PARTIAL SEIZURE DISORDER: ICD-10-CM

## 2024-11-13 DIAGNOSIS — Z00.00 ROUTINE GENERAL MEDICAL EXAMINATION AT A HEALTH CARE FACILITY: ICD-10-CM

## 2024-11-13 DIAGNOSIS — L65.9 HAIR LOSS: ICD-10-CM

## 2024-11-13 LAB
ALBUMIN SERPL BCP-MCNC: 4.2 G/DL (ref 3.5–5.2)
ALP SERPL-CCNC: 74 U/L (ref 40–150)
ALT SERPL W/O P-5'-P-CCNC: 20 U/L (ref 10–44)
ANION GAP SERPL CALC-SCNC: 8 MMOL/L (ref 8–16)
AST SERPL-CCNC: 27 U/L (ref 10–40)
BILIRUB SERPL-MCNC: 1.4 MG/DL (ref 0.1–1)
BUN SERPL-MCNC: 15 MG/DL (ref 6–20)
CALCIUM SERPL-MCNC: 9.6 MG/DL (ref 8.7–10.5)
CHLORIDE SERPL-SCNC: 106 MMOL/L (ref 95–110)
CHOLEST SERPL-MCNC: 119 MG/DL (ref 120–199)
CHOLEST/HDLC SERPL: 2.8 {RATIO} (ref 2–5)
CO2 SERPL-SCNC: 26 MMOL/L (ref 23–29)
CREAT SERPL-MCNC: 0.9 MG/DL (ref 0.5–1.4)
ERYTHROCYTE [DISTWIDTH] IN BLOOD BY AUTOMATED COUNT: 12.1 % (ref 11.5–14.5)
EST. GFR  (NO RACE VARIABLE): >60 ML/MIN/1.73 M^2
ESTIMATED AVG GLUCOSE: 103 MG/DL (ref 68–131)
GLUCOSE SERPL-MCNC: 96 MG/DL (ref 70–110)
HBA1C MFR BLD: 5.2 % (ref 4–5.6)
HCT VFR BLD AUTO: 46.4 % (ref 40–54)
HDLC SERPL-MCNC: 43 MG/DL (ref 40–75)
HDLC SERPL: 36.1 % (ref 20–50)
HGB BLD-MCNC: 15.7 G/DL (ref 14–18)
LDLC SERPL CALC-MCNC: 63 MG/DL (ref 63–159)
MCH RBC QN AUTO: 29.6 PG (ref 27–31)
MCHC RBC AUTO-ENTMCNC: 33.8 G/DL (ref 32–36)
MCV RBC AUTO: 88 FL (ref 82–98)
NONHDLC SERPL-MCNC: 76 MG/DL
PLATELET # BLD AUTO: 205 K/UL (ref 150–450)
PMV BLD AUTO: 12.3 FL (ref 9.2–12.9)
POTASSIUM SERPL-SCNC: 4.3 MMOL/L (ref 3.5–5.1)
PROT SERPL-MCNC: 6.9 G/DL (ref 6–8.4)
RBC # BLD AUTO: 5.3 M/UL (ref 4.6–6.2)
SODIUM SERPL-SCNC: 140 MMOL/L (ref 136–145)
T4 FREE SERPL-MCNC: 1.04 NG/DL (ref 0.71–1.51)
TRIGL SERPL-MCNC: 65 MG/DL (ref 30–150)
TSH SERPL DL<=0.005 MIU/L-ACNC: 1.89 UIU/ML (ref 0.4–4)
WBC # BLD AUTO: 6.36 K/UL (ref 3.9–12.7)

## 2024-11-13 PROCEDURE — 99999 PR PBB SHADOW E&M-EST. PATIENT-LVL IV: CPT | Mod: PBBFAC,,, | Performed by: FAMILY MEDICINE

## 2024-11-13 PROCEDURE — 1160F RVW MEDS BY RX/DR IN RCRD: CPT | Mod: CPTII,S$GLB,, | Performed by: FAMILY MEDICINE

## 2024-11-13 PROCEDURE — 80061 LIPID PANEL: CPT | Performed by: FAMILY MEDICINE

## 2024-11-13 PROCEDURE — 84439 ASSAY OF FREE THYROXINE: CPT | Performed by: FAMILY MEDICINE

## 2024-11-13 PROCEDURE — 3078F DIAST BP <80 MM HG: CPT | Mod: CPTII,S$GLB,, | Performed by: FAMILY MEDICINE

## 2024-11-13 PROCEDURE — 83036 HEMOGLOBIN GLYCOSYLATED A1C: CPT | Performed by: FAMILY MEDICINE

## 2024-11-13 PROCEDURE — 80053 COMPREHEN METABOLIC PANEL: CPT | Performed by: FAMILY MEDICINE

## 2024-11-13 PROCEDURE — 99395 PREV VISIT EST AGE 18-39: CPT | Mod: S$GLB,,, | Performed by: FAMILY MEDICINE

## 2024-11-13 PROCEDURE — 3074F SYST BP LT 130 MM HG: CPT | Mod: CPTII,S$GLB,, | Performed by: FAMILY MEDICINE

## 2024-11-13 PROCEDURE — 84443 ASSAY THYROID STIM HORMONE: CPT | Performed by: FAMILY MEDICINE

## 2024-11-13 PROCEDURE — 36415 COLL VENOUS BLD VENIPUNCTURE: CPT | Mod: PN | Performed by: FAMILY MEDICINE

## 2024-11-13 PROCEDURE — 85027 COMPLETE CBC AUTOMATED: CPT | Performed by: FAMILY MEDICINE

## 2024-11-13 PROCEDURE — 1159F MED LIST DOCD IN RCRD: CPT | Mod: CPTII,S$GLB,, | Performed by: FAMILY MEDICINE

## 2024-11-13 PROCEDURE — 3008F BODY MASS INDEX DOCD: CPT | Mod: CPTII,S$GLB,, | Performed by: FAMILY MEDICINE

## 2024-11-13 RX ORDER — FINASTERIDE 1 MG/1
1 TABLET, FILM COATED ORAL DAILY
COMMUNITY
Start: 2024-10-09

## 2024-11-13 RX ORDER — MINOXIDIL 2.5 MG/1
2.5 TABLET ORAL DAILY
COMMUNITY
Start: 2024-10-09

## 2024-11-13 NOTE — PROGRESS NOTES
Chief Complaint  Chief Complaint   Patient presents with    Annual Exam       HPI  Huber Mahajan is a 23 y.o. male with multiple medical diagnoses as listed in the medical history and problem list that presents for  in person visit.     History of Present Illness    CHIEF COMPLAINT:  - Patient presents for a follow-up visit to discuss recent sleep issues and to request a refill for scopolamine patches for an upcoming trip.    HPI:  Patient reports significant sleep disturbances for approximately 2 months, with difficulty falling asleep and staying asleep. He has multiple awakenings during the night, up to 8-9 times on some occasions, affecting his quality of rest and resulting in suboptimal feeling upon waking in the morning. He employs self-hypnosis techniques, previously learned for anxiety management, to aid in relaxation and sleep initiation, but this method is not consistently effective. He attributes the sleep issues to potential stress related to his current busy schedule, which includes school, LSAT preparation, an internship, and another job.    Patient reports current use of finasteride and minoxidil for hair loss treatment. He has been taking these medications daily for at least 6 months and reports positive results with no significant hair loss or side effects.    Patient mentions a previous gallbladder issue from December of last year. He had severe symptoms including diaphoresis, syncope, and intense pain, which led to hospitalization. Patient was diagnosed with cholelithiasis, and a cholecystectomy was subsequently performed.    Patient requests a refill for scopolamine patches for an upcoming trip to [place] in January to prevent motion sickness during the flight.    Patient denies anxiety, panic attacks, lightheadedness, weakness, decreased libido, erectile dysfunction, or current seizures.    MEDICATIONS:  - Finasteride, daily, for hair loss, taken for at least 6 months, effective with no side  effects  - Minoxidil, daily, for hair loss, taken for at least 6 months, effective with no side effects  - Scopolamine patch, as needed for motion sickness, effective    PMH:  - Cholelithiasis: December last year.  Anxiety: Years ago.  Seizures: Last episode during gallbladder issues.    FAMILY HISTORY:  - Sister: Currently in medical school    RECENT/REMOTE SURGICAL HISTORY:  - Cholecystectomy: December last year, due to cholelithiasis and severe symptoms (diaphoresis, syncope, severe pain)    SOCIAL HISTORY:  - Occupation: Student, preparing for LSAT  - Internship and another job mentioned         No questionnaires on file.       Pmh, Psh, Family Hx, Social Hx, HM updated in Epic Tabs today.    Review of Systems   Constitutional:  Negative for chills and fatigue.   HENT:  Negative for sore throat and trouble swallowing.    Respiratory:  Negative for cough and shortness of breath.    Cardiovascular:  Negative for chest pain and leg swelling.   Gastrointestinal:  Negative for abdominal pain, constipation, diarrhea and nausea.   Genitourinary:  Negative for difficulty urinating, dysuria and flank pain.   Musculoskeletal:  Negative for arthralgias and joint swelling.   Neurological:  Negative for dizziness and headaches.   Psychiatric/Behavioral:  Positive for sleep disturbance.         Objective:     Vitals:    11/13/24 0843   BP: 116/68   BP Location: Right arm   Patient Position: Sitting   Pulse: 69   Resp: 18   Temp: 98 °F (36.7 °C)   TempSrc: Tympanic   SpO2: 98%   Weight: 93.9 kg (206 lb 14.4 oz)   Height: 6' (1.829 m)     Wt Readings from Last 10 Encounters:   11/13/24 93.9 kg (206 lb 14.4 oz)   11/13/23 90.6 kg (199 lb 11.8 oz)   08/18/23 90.3 kg (199 lb)   12/02/22 90.5 kg (199 lb 8.3 oz)   11/18/22 87.5 kg (193 lb)   11/10/22 87.6 kg (193 lb 2 oz)   10/14/22 90.3 kg (199 lb)   04/08/22 90.3 kg (199 lb)   02/04/22 90.3 kg (199 lb)   01/04/22 90.3 kg (199 lb)     Physical Exam    TEST RESULTS:  - Labs: 2 years  ago, results not specified.       Physical Exam  Vitals reviewed.   Constitutional:       General: He is not in acute distress.     Appearance: Normal appearance. He is well-developed and normal weight.   HENT:      Head: Normocephalic and atraumatic.      Right Ear: Tympanic membrane and external ear normal.      Left Ear: Tympanic membrane and external ear normal.      Nose: Nose normal.      Mouth/Throat:      Mouth: Mucous membranes are moist.      Pharynx: Oropharynx is clear.   Eyes:      Conjunctiva/sclera: Conjunctivae normal.      Pupils: Pupils are equal, round, and reactive to light.   Neck:      Thyroid: No thyromegaly.   Cardiovascular:      Rate and Rhythm: Normal rate and regular rhythm.      Heart sounds: No murmur heard.     No friction rub. No gallop.   Pulmonary:      Effort: Pulmonary effort is normal. No respiratory distress.      Breath sounds: Normal breath sounds.   Abdominal:      General: Bowel sounds are normal. There is no distension.      Palpations: Abdomen is soft.      Tenderness: There is no abdominal tenderness. There is no rebound.   Musculoskeletal:         General: Normal range of motion.      Cervical back: Normal range of motion and neck supple.   Lymphadenopathy:      Cervical: No cervical adenopathy.   Skin:     General: Skin is warm and dry.   Neurological:      General: No focal deficit present.      Mental Status: He is alert and oriented to person, place, and time.      Coordination: Coordination normal.   Psychiatric:         Attention and Perception: Attention normal.         Mood and Affect: Mood and affect normal.         Speech: Speech normal.         Behavior: Behavior normal.         Thought Content: Thought content normal.         Cognition and Memory: Cognition normal.         Judgment: Judgment normal.         Assessment:     1. Routine general medical examination at a health care facility    2. Hair loss    3. Complex partial seizure disorder    4. Insomnia,  unspecified type        LABS:   Lab Results   Component Value Date    HGBA1C 5.2 11/10/2022      Lab Results   Component Value Date    CHOL 120 11/10/2022     Lab Results   Component Value Date    LDLCALC 58.2 (L) 11/10/2022     Lab Results   Component Value Date    WBC 7.66 11/10/2022    HGB 15.5 11/10/2022    HCT 47.2 11/10/2022     11/10/2022    CHOL 120 11/10/2022    TRIG 54 11/10/2022    HDL 51 11/10/2022    ALT 42 11/10/2022    AST 41 (H) 11/10/2022     11/10/2022    K 4.1 11/10/2022     11/10/2022    CREATININE 1.0 11/10/2022    BUN 18 11/10/2022    CO2 26 11/10/2022    TSH 2.212 11/10/2022    HGBA1C 5.2 11/10/2022       Plan:   Huber was seen today for annual exam.    Diagnoses and all orders for this visit:    Routine general medical examination at a health care facility  -     Hemoglobin A1C; Future  -     CBC Without Differential; Future  -     Comprehensive Metabolic Panel; Future  -     TSH; Future  -     T4, Free; Future  -     Lipid Panel; Future    Hair loss  -     CBC Without Differential; Future  -     Comprehensive Metabolic Panel; Future  -     TSH; Future  -     T4, Free; Future    Complex partial seizure disorder  -     Hemoglobin A1C; Future  -     CBC Without Differential; Future  -     Comprehensive Metabolic Panel; Future  -     TSH; Future  -     T4, Free; Future  -     Lipid Panel; Future    Insomnia, unspecified type  -     TSH; Future  -     T4, Free; Future        Assessment & Plan    IMPRESSION:  - Assessed sleep issues, likely stress-related due to multiple commitments  - Considered OTC sleep aids, recommending lighter options due to patient's previous sensitivity  - Evaluated current medications, including finasteride and minoxidil for hair loss, noting no side effects  - Reviewed recent gallbladder removal and associated complications  - Will order lab work to check blood counts, thyroid function, and overall health status post-surgery    PLAN SUMMARY:  - Order  CBC, thyroid function tests, and comprehensive lab work  - Continue finasteride and minoxidil for hair loss treatment  - Recommend sleep interventions: RemFresh 2-5mg, magnesium supplement, warm whey protein shake before bedtime  - Refill scopolamine patch for motion sickness, with refills through June  - Suggest doxylamine or diphenhydramine at reduced doses if needed for sleep  - Advise on stress management techniques and good sleep hygiene practices  - 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  - Patient reports no seizures since the last episode, which occurred during a severe gallbladder issue associated with sepsis and multiple symptoms.  - Evaluated the patient's seizure history in light of this information.    INSOMNIA:  - Educated the patient about sleep regulation and potential interventions.  - Discussed the role of melatonin, antihistamines (diphenhydramine and doxylamine), whey protein, and magnesium in improving sleep quality.  - Instructed on good sleep hygiene practices, including avoiding studying right before bed.  - Recommend interventions in order of preference: 1.  - Magnesium supplement at nighttime 2.  - RemFresh (extended-release melatonin) 2-5mg, 30 minutes before bedtime 3.  - Children's Benadryl (diphenhydramine) liquid, 2.5mL (12.5mg) at bedtime 4.  - Doxylamine (e.g., in ZzzQuil) at half the recommended dose at bedtime  - Suggested incorporating a warm whey protein shake about 30 minutes before bedtime.  - Discussed stress management techniques, including brief outdoor walks (10 minutes) to clear mind.  - Noted that sleep issues have persisted for 2 months, with difficulty falling and staying asleep, waking up 8-9 times per night, likely stress-related due to busy schedule.  - Advised to contact the office if  sleep issues persist or worsen after trying recommended interventions.    SICKNESS:  - Refilled scopolamine patch for motion sickness, with refills available through June.  - Noted that the patient plans to use the patch for an upcoming trip to [place] [employer] [place] in January.    LABS:  - Ordered CBC, thyroid function tests, and comprehensive lab work to check overall health status post-surgery and rule out any medical causes for sleep issues.    POST-CHOLECYSTECTOMY:  - Evaluated post-operative status, noting that the patient reports feeling better after gallbladder re  moval.    LOSS:  - Continued finasteride and minoxidil for hair loss treatment.  - Patient has been using these medications for at least 6 months, reporting no major hair loss since starting treatment and no side effects.  - Discussed potential side effects, including hypotension and sexual side effects.    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    UP:  - Follow up next year or sooner if needed.         MRI Arthrogram Hip With Contrast Right  Narrative: EXAMINATION:  MRI ARTHROGRAM HIP RIGHT    CLINICAL HISTORY:  Hip pain, chronic, labral tear suspected, xray done;  Pain in unspecified hip    TECHNIQUE:  Multiplanar/multisequence MRI of the right hip was performed following injection of intra-articular gadolinium.    COMPARISON:  11/12/2021    FINDINGS:  Bone:  No evidence of fracture or marrow replacement process. No evidence of avascular necrosis.   The femoral head demonstrates normal contour.    Labrum: Anterosuperior labrum appears diminutive with irregular surface contours at the site of a previously described tear.  The findings are likely largely represent postoperative change although a small surface tear would be difficult to exclude.  No definite contrast undercutting seen at the base of the labrum in this region.  Some susceptibility artifact seen in the underlying  "acetabulum at this region, correlates with a surgical anchor.  There is some persistent smooth shallow contrast undercutting seen throughout the posterosuperior labrum which is unchanged from prior and may potentially represent a sulcus.  Correlate with previous arthroscopic findings.    Cartilage: Articular cartilage appears well maintained.    Tendons: Hip adductor, hamstring and iliopsoas tendons are unremarkable.    Miscellaneous: Limited evaluation of pelvic soft tissues is unremarkable  Impression: 1. Appearance of the acetabular labrum as detailed above.    Electronically signed by: Yemi James MD  Date:    11/11/2022  Time:    13:30  XR ARTHROGRAM HIP RIGHT, INJECTION ONLY WITH MRI TO FOLLOW (XPD)  Narrative: EXAMINATION:  XR ARTHROGRAM HIP RIGHT, INJECTION ONLY WITH MRI TO FOLLOW (XPD)    CLINICAL HISTORY:  Other sprain of right hip, subsequent encounter    TECHNIQUE:  Following written informed consent and discussion of applicable risks and benefits the patient was placed in the supine position on the examination table.  Using sterile technique and 1% lidocaine for local anesthesia a 22-gauge 3.5 "needle was advanced into the right hip joint under fluoroscopic guidance.  Subsequently, 15 cc of a 1:200 solution of gadolinium in normal saline and iodinated contrast material was instilled into the right hip joint.  The procedure was tolerated well with no immediate complications.    COMPARISON:  None    FINDINGS:  Single spot image obtained and demonstrates normal contrast opacification of the right hip joint.    Total fluoroscopy time: 0.6 minutes  Impression: Successful and uneventful right hip injection for MR arthrography.    Electronically signed by: Yemi James MD  Date:    11/11/2022  Time:    11:08    The ASCVD Risk score (Salty DK, et al., 2019) failed to calculate for the following reasons:    The 2019 ASCVD risk score is only valid for ages 40 to 79    Follow-up: Follow up in about 1 " "year (around 11/13/2025) for physical with Dr ROCKWELL.    I spent a total of  30     minutes face to face and non-face to face on the date of this visit.This includes time preparing to see the patient (eg, review of tests, notes), obtaining and/or reviewing additional history from an independent historian and/or outside medical records, documenting clinical information in the electronic health record, independently interpreting results and/or communicating results to the patient/family/caregiver, or care coordinator.  Visit today included increased complexity associated with the care of the episodic problem addressed and managing the longitudinal care of the patient due to the serious and/or complex managed problem(s).    This note was generated with the assistance of ambient listening technology. Verbal consent was obtained by the patient and accompanying visitor(s) for the recording of patient appointment to facilitate this note. I attest to having reviewed and edited the generated note for accuracy, though some syntax or spelling errors may persist. Please contact the author of this note for any clarification.       Patient Instructions   Whey protein- ( milk) - warm milk warm protein shake     Magnesium - stress, headaches, sleep , muscle spasms "CALM"     REM Fresh - melatonin 2mg, 5mg, 10mg, extended release     Benedryl ('PM")- typical dose 25mg ( no more 12.5mg )     Doxylamine- Zquil- 1/2 dose     "

## 2024-11-13 NOTE — PATIENT INSTRUCTIONS
"Whey protein- ( milk) - warm milk warm protein shake     Magnesium - stress, headaches, sleep , muscle spasms "CALM"     REM Fresh - melatonin 2mg, 5mg, 10mg, extended release     Benedryl ('PM")- typical dose 25mg ( no more 12.5mg )     Doxylamine- Zquil- 1/2 dose     "

## 2024-12-29 NOTE — TELEPHONE ENCOUNTER
No care due was identified.  [facility] Embedded Care Due Messages. Reference number: [id number].   12/29/2024 3:51:55 PM CST

## 2024-12-30 RX ORDER — SCOLOPAMINE TRANSDERMAL SYSTEM 1 MG/1
1 PATCH, EXTENDED RELEASE TRANSDERMAL
Qty: 3 PATCH | Refills: 2 | Status: SHIPPED | OUTPATIENT
Start: 2024-12-30

## 2025-04-07 ENCOUNTER — OFFICE VISIT (OUTPATIENT)
Dept: PRIMARY CARE CLINIC | Facility: CLINIC | Age: 25
End: 2025-04-07
Payer: COMMERCIAL

## 2025-04-07 VITALS
BODY MASS INDEX: 27.86 KG/M2 | HEART RATE: 59 BPM | TEMPERATURE: 94 F | SYSTOLIC BLOOD PRESSURE: 110 MMHG | HEIGHT: 72 IN | WEIGHT: 205.69 LBS | DIASTOLIC BLOOD PRESSURE: 68 MMHG | OXYGEN SATURATION: 98 %

## 2025-04-07 DIAGNOSIS — F41.1 GAD (GENERALIZED ANXIETY DISORDER): Primary | ICD-10-CM

## 2025-04-07 DIAGNOSIS — R11.0 NAUSEA: ICD-10-CM

## 2025-04-07 PROCEDURE — 3078F DIAST BP <80 MM HG: CPT | Mod: CPTII,S$GLB,, | Performed by: FAMILY MEDICINE

## 2025-04-07 PROCEDURE — 99214 OFFICE O/P EST MOD 30 MIN: CPT | Mod: S$GLB,,, | Performed by: FAMILY MEDICINE

## 2025-04-07 PROCEDURE — 3074F SYST BP LT 130 MM HG: CPT | Mod: CPTII,S$GLB,, | Performed by: FAMILY MEDICINE

## 2025-04-07 PROCEDURE — 99999 PR PBB SHADOW E&M-EST. PATIENT-LVL III: CPT | Mod: PBBFAC,,, | Performed by: FAMILY MEDICINE

## 2025-04-07 PROCEDURE — 3008F BODY MASS INDEX DOCD: CPT | Mod: CPTII,S$GLB,, | Performed by: FAMILY MEDICINE

## 2025-04-07 PROCEDURE — G2211 COMPLEX E/M VISIT ADD ON: HCPCS | Mod: S$GLB,,, | Performed by: FAMILY MEDICINE

## 2025-04-07 PROCEDURE — 1160F RVW MEDS BY RX/DR IN RCRD: CPT | Mod: CPTII,S$GLB,, | Performed by: FAMILY MEDICINE

## 2025-04-07 PROCEDURE — 1159F MED LIST DOCD IN RCRD: CPT | Mod: CPTII,S$GLB,, | Performed by: FAMILY MEDICINE

## 2025-04-07 RX ORDER — ESCITALOPRAM OXALATE 10 MG/1
10 TABLET ORAL DAILY
Qty: 90 TABLET | Refills: 3 | Status: SHIPPED | OUTPATIENT
Start: 2025-04-07 | End: 2026-04-07

## 2025-04-07 RX ORDER — ONDANSETRON 4 MG/1
4 TABLET, ORALLY DISINTEGRATING ORAL EVERY 12 HOURS PRN
Qty: 30 TABLET | Refills: 1 | Status: SHIPPED | OUTPATIENT
Start: 2025-04-07

## 2025-04-07 RX ORDER — HYDROXYZINE HYDROCHLORIDE 10 MG/1
10 TABLET, FILM COATED ORAL 3 TIMES DAILY PRN
Qty: 30 TABLET | Refills: 2 | Status: SHIPPED | OUTPATIENT
Start: 2025-04-07

## 2025-04-07 NOTE — PROGRESS NOTES
Chief Complaint  Chief Complaint   Patient presents with    Anxiety       HPI  Huber Mahajan is a 24 y.o. male with multiple medical diagnoses as listed in the medical history and problem list that presents for  in person visit.     History of Present Illness    CHIEF COMPLAINT:  - Patient, a 24-year-old male, presents with worsening anxiety and associated nausea and vomiting, seeking medication to manage his symptoms.    HPI:  Patient reports increased anxiety over the past 2 months. He describes his anxiety as severe, causing frequent vomiting and making daily activities more challenging. His anxiety manifests primarily as nausea, often leading to vomiting. This pattern of anxiety-induced nausea and vomiting has been present since childhood, though it has somewhat improved with age. Currently, he has severe anxiety attacks 3-4 times per week, often resulting in vomiting. He notes that vomiting provides some relief from his anxiety symptoms, and he sometimes induces it for symptom alleviation.    His sleep is affected intermittently, with some nights being undisturbed and others involving waking up 4-5 times. He states that sleep is not his primary concern. Body aches are also mentioned as a bothersome symptom associated with his anxiety.    He has anxiety symptoms daily, with varying intensity. On non-work days, he sometimes prefers staying in bed, though he generally dislikes being bedridden all day. He feels significant pressure, possibly self-imposed, related to his current life circumstances and future plans. 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    He previously used scopolamine patches for a few days while preparing for a law school entrance exam and believes they may have helped with his symptoms, though he is unsure if it was a placebo effect.    He denies any significant issues with diarrhea related to his  anxiety, current erectile dysfunction, or decreased sex drive.    MEDICATIONS:  - Propecia (Finasteride), started over 1.5 years ago, for hair growth  - Minoxidil (Rogaine), started over 1.5 years ago, for hair growth  - Scopolamine patches, used for 3-4 days in December for motion sickness during a trip/cruise    PMH:  - Anxiety: Since childhood    FAMILY HISTORY:  - Father: On medication for anxiety  - Sister (Ulisses): On Zoloft  - Sister (Fadi): On Lexapro (suspected)    SOCIAL HISTORY:  - Occupation: Working part time         No questionnaires on file.       Pmh, Psh, Family Hx, Social Hx, HM updated in Epic Tabs today.    Review of Systems   Constitutional:  Positive for appetite change. Negative for activity change, fatigue and unexpected weight change.   Gastrointestinal:  Positive for nausea and vomiting. Negative for abdominal distention and abdominal pain.   Psychiatric/Behavioral:  Negative for decreased concentration, dysphoric mood and sleep disturbance. The patient is nervous/anxious.         Objective:     Vitals:    04/07/25 0722   BP: 110/68   BP Location: Left arm   Patient Position: Sitting   Pulse: (!) 59   Temp: (!) 94.3 °F (34.6 °C)   TempSrc: Tympanic   SpO2: 98%   Weight: 93.3 kg (205 lb 11 oz)   Height: 6' (1.829 m)     Wt Readings from Last 10 Encounters:   04/07/25 93.3 kg (205 lb 11 oz)   11/13/24 93.9 kg (206 lb 14.4 oz)   11/13/23 90.6 kg (199 lb 11.8 oz)   08/18/23 90.3 kg (199 lb)   12/02/22 90.5 kg (199 lb 8.3 oz)   11/18/22 87.5 kg (193 lb)   11/10/22 87.6 kg (193 lb 2 oz)   10/14/22 90.3 kg (199 lb)   04/08/22 90.3 kg (199 lb)   02/04/22 90.3 kg (199 lb)     Physical Exam            Physical Exam  Vitals reviewed.   Constitutional:       Appearance: Normal appearance. He is normal weight.   Neurological:      Mental Status: He is alert.   Psychiatric:         Attention and Perception: Attention and perception normal.         Mood and Affect: Affect normal. Mood is anxious.          Speech: Speech normal.         Behavior: Behavior normal. Behavior is cooperative.         Thought Content: Thought content normal.         Assessment:   LABS:   Lab Results   Component Value Date    HGBA1C 5.2 11/13/2024    HGBA1C 5.2 11/10/2022      Lab Results   Component Value Date    CHOL 119 (L) 11/13/2024    CHOL 120 11/10/2022     Lab Results   Component Value Date    LDLCALC 63.0 11/13/2024    LDLCALC 58.2 (L) 11/10/2022     Lab Results   Component Value Date    WBC 6.36 11/13/2024    HGB 15.7 11/13/2024    HCT 46.4 11/13/2024     11/13/2024    CHOL 119 (L) 11/13/2024    TRIG 65 11/13/2024    HDL 43 11/13/2024    ALT 20 11/13/2024    AST 27 11/13/2024     11/13/2024    K 4.3 11/13/2024     11/13/2024    CREATININE 0.9 11/13/2024    BUN 15 11/13/2024    CO2 26 11/13/2024    TSH 1.893 11/13/2024    HGBA1C 5.2 11/13/2024       Plan:   Assessment & Plan    F41.1 ANAT (generalized anxiety disorder)  R11.0 Nausea    IMPRESSION:  Anxiety symptoms include frequent vomiting and body aches.  Family history of anxiety and current use of Propecia/Finasteride for hair growth.  Scopolamine patches previously used for nausea relief.  Chose over Zoloft due to GI symptoms and potential for fewer side effects.  Discussed potential sexual side effects of combining Finasteride with SSRI therapy.    PLAN SUMMARY:  - Started Lexapro 5 mg daily for first week, then increase to 10 mg daily  - Started Hydroxyzine as needed for acute anxiety, panic attacks, or insomnia  - Started Zofran as needed for nausea  - Contact office if experiencing issues or side effects before scheduled follow-up  - Follow-up in 4 weeks to assess medication efficacy and side effects  - Patient can cancel follow-up visit if doing well on medication    (GENERALIZED ANXIETY DISORDER):  - Explained mechanism of action for increasing serotonin levels, potential initial side effects including vivid dreams that typically subside after first  week, and gradual onset of effects taking 2-3 weeks.  - Started Lexapro 10 mg tablets: Take 5 mg (half tablet) daily for first week, then increase to 10 mg (full tablet) daily.  - Can be taken at night or morning based on patient preference and side effects.  - Started Hydroxyzine as needed for acute anxiety, panic attacks, or insomnia.  - Follow up in 4 weeks to assess medication efficacy and side effects.  - Contact office if experiencing any issues or side effects before scheduled follow-up.  - Patient can cancel follow-up visit if doing well on medication.    NAUSEA:  - Explained potential initial side effects including nausea that typically subsides after first week.  - Started Zofran as needed for nausea.       Huber was seen today for anxiety.    Diagnoses and all orders for this visit:    ANAT (generalized anxiety disorder)  -     EScitalopram oxalate (LEXAPRO) 10 MG tablet; Take 1 tablet (10 mg total) by mouth once daily.  -     hydrOXYzine HCL (ATARAX) 10 MG Tab; Take 1 tablet (10 mg total) by mouth 3 (three) times daily as needed (insomnia, anxiety, panic attacks,).    Nausea  -     ondansetron (ZOFRAN-ODT) 4 MG TbDL; Take 1 tablet (4 mg total) by mouth every 12 (twelve) hours as needed (nausea).        MRI Arthrogram Hip With Contrast Right  Narrative: EXAMINATION:  MRI ARTHROGRAM HIP RIGHT    CLINICAL HISTORY:  Hip pain, chronic, labral tear suspected, xray done;  Pain in unspecified hip    TECHNIQUE:  Multiplanar/multisequence MRI of the right hip was performed following injection of intra-articular gadolinium.    COMPARISON:  11/12/2021    FINDINGS:  Bone:  No evidence of fracture or marrow replacement process. No evidence of avascular necrosis.   The femoral head demonstrates normal contour.    Labrum: Anterosuperior labrum appears diminutive with irregular surface contours at the site of a previously described tear.  The findings are likely largely represent postoperative change although a small  "surface tear would be difficult to exclude.  No definite contrast undercutting seen at the base of the labrum in this region.  Some susceptibility artifact seen in the underlying acetabulum at this region, correlates with a surgical anchor.  There is some persistent smooth shallow contrast undercutting seen throughout the posterosuperior labrum which is unchanged from prior and may potentially represent a sulcus.  Correlate with previous arthroscopic findings.    Cartilage: Articular cartilage appears well maintained.    Tendons: Hip adductor, hamstring and iliopsoas tendons are unremarkable.    Miscellaneous: Limited evaluation of pelvic soft tissues is unremarkable  Impression: 1. Appearance of the acetabular labrum as detailed above.    Electronically signed by: Yemi James MD  Date:    11/11/2022  Time:    13:30  XR ARTHROGRAM HIP RIGHT, INJECTION ONLY WITH MRI TO FOLLOW (XPD)  Narrative: EXAMINATION:  XR ARTHROGRAM HIP RIGHT, INJECTION ONLY WITH MRI TO FOLLOW (XPD)    CLINICAL HISTORY:  Other sprain of right hip, subsequent encounter    TECHNIQUE:  Following written informed consent and discussion of applicable risks and benefits the patient was placed in the supine position on the examination table.  Using sterile technique and 1% lidocaine for local anesthesia a 22-gauge 3.5 "needle was advanced into the right hip joint under fluoroscopic guidance.  Subsequently, 15 cc of a 1:200 solution of gadolinium in normal saline and iodinated contrast material was instilled into the right hip joint.  The procedure was tolerated well with no immediate complications.    COMPARISON:  None    FINDINGS:  Single spot image obtained and demonstrates normal contrast opacification of the right hip joint.    Total fluoroscopy time: 0.6 minutes  Impression: Successful and uneventful right hip injection for MR arthrography.    Electronically signed by: Yemi James MD  Date:    11/11/2022  Time:    11:08    The ASCVD Risk " score (Salty BEYER, et al., 2019) failed to calculate for the following reasons:    The 2019 ASCVD risk score is only valid for ages 40 to 79    Follow-up: Follow up in about 4 weeks (around 5/5/2025) for f/u [facility] Dr. Joseline mata .    I spent a total of   35    minutes face to face and non-face to face on the date of this visit.This includes time preparing to see the patient (eg, review of tests, notes), obtaining and/or reviewing additional history from an independent historian and/or outside medical records, documenting clinical information in the electronic health record, independently interpreting results and/or communicating results to the patient/family/caregiver, or care coordinator.  Visit today included increased complexity associated with the care of the episodic problem addressed and managing the longitudinal care of the patient due to the serious and/or complex managed problem(s).    This note was generated with the assistance of ambient listening technology. Verbal consent was obtained by the patient and accompanying visitor(s) for the recording of patient appointment to facilitate this note. I attest to having reviewed and edited the generated note for accuracy, though some syntax or spelling errors may persist. Please contact the author of this note for any clarification.       There are no Patient Instructions on file for this visit.

## 2025-04-16 ENCOUNTER — PATIENT MESSAGE (OUTPATIENT)
Dept: PRIMARY CARE CLINIC | Facility: CLINIC | Age: 25
End: 2025-04-16
Payer: COMMERCIAL

## 2025-04-16 DIAGNOSIS — F41.1 ANXIETY STATE: ICD-10-CM

## 2025-04-16 DIAGNOSIS — F41.1 GAD (GENERALIZED ANXIETY DISORDER): Primary | ICD-10-CM

## 2025-06-10 ENCOUNTER — TELEPHONE (OUTPATIENT)
Dept: PSYCHIATRY | Facility: CLINIC | Age: 25
End: 2025-06-10
Payer: COMMERCIAL

## 2025-06-24 ENCOUNTER — OFFICE VISIT (OUTPATIENT)
Dept: PSYCHIATRY | Facility: CLINIC | Age: 25
End: 2025-06-24
Payer: COMMERCIAL

## 2025-06-24 VITALS
BODY MASS INDEX: 30.02 KG/M2 | WEIGHT: 221.31 LBS | SYSTOLIC BLOOD PRESSURE: 126 MMHG | DIASTOLIC BLOOD PRESSURE: 84 MMHG | HEART RATE: 62 BPM

## 2025-06-24 DIAGNOSIS — F41.0 GENERALIZED ANXIETY DISORDER WITH PANIC ATTACKS: Primary | ICD-10-CM

## 2025-06-24 DIAGNOSIS — F51.04 PSYCHOPHYSIOLOGICAL INSOMNIA: ICD-10-CM

## 2025-06-24 DIAGNOSIS — R11.0 NAUSEA: ICD-10-CM

## 2025-06-24 DIAGNOSIS — F41.1 GENERALIZED ANXIETY DISORDER WITH PANIC ATTACKS: Primary | ICD-10-CM

## 2025-06-24 DIAGNOSIS — F32.A DEPRESSION, UNSPECIFIED DEPRESSION TYPE: ICD-10-CM

## 2025-06-24 PROCEDURE — 3079F DIAST BP 80-89 MM HG: CPT | Mod: CPTII,S$GLB,, | Performed by: PSYCHIATRY & NEUROLOGY

## 2025-06-24 PROCEDURE — 99999 PR PBB SHADOW E&M-EST. PATIENT-LVL III: CPT | Mod: PBBFAC,,, | Performed by: PSYCHIATRY & NEUROLOGY

## 2025-06-24 PROCEDURE — 1160F RVW MEDS BY RX/DR IN RCRD: CPT | Mod: CPTII,S$GLB,, | Performed by: PSYCHIATRY & NEUROLOGY

## 2025-06-24 PROCEDURE — 3074F SYST BP LT 130 MM HG: CPT | Mod: CPTII,S$GLB,, | Performed by: PSYCHIATRY & NEUROLOGY

## 2025-06-24 PROCEDURE — 1159F MED LIST DOCD IN RCRD: CPT | Mod: CPTII,S$GLB,, | Performed by: PSYCHIATRY & NEUROLOGY

## 2025-06-24 PROCEDURE — 90792 PSYCH DIAG EVAL W/MED SRVCS: CPT | Mod: S$GLB,,, | Performed by: PSYCHIATRY & NEUROLOGY

## 2025-06-24 RX ORDER — CLONAZEPAM 0.25 MG/1
0.25 TABLET, ORALLY DISINTEGRATING ORAL 2 TIMES DAILY
Qty: 60 TABLET | Refills: 0 | Status: SHIPPED | OUTPATIENT
Start: 2025-06-24 | End: 2025-07-24

## 2025-06-24 RX ORDER — MIRTAZAPINE 15 MG/1
TABLET, FILM COATED ORAL
Qty: 30 TABLET | Refills: 0 | Status: SHIPPED | OUTPATIENT
Start: 2025-06-24

## 2025-06-24 NOTE — PROGRESS NOTES
"Outpatient Psychiatry Initial Visit     6/24/2025    Huber Mahajan, a 24 y.o. male, presenting for Initial Psychiatric Evaluation visit. Met with patient.    Reason for Encounter: Patient complains of anxiety.    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 Average score. Did not get into [employer]. Too expensive to go elsewhere. Rethinking his career.   Enrolling in the J[facility]MAN program at [facility].   "Over thinking"; "second guesser", "what if"  Does self talk to motivate himself.   "I am not the best at talking with others;" very impatient; Girl friend is very helpful.  Took lexapro in April -- it made things worse; started having suicidal ideation. It made him think more; did not sleep well after starting lexapro.     Stressors:  Career issues   Sleep:  Goes to bed late. Has the most energy when it is night time. Night owl. About 12 -2 am. Wakes up around 8-9 am. Usually wakes up for 20-30 mins and falls asleep. About 6-7 hours per night.   Aydin: diet has been bad. Eating junk food for last few months. Has gained about 14 lbs recently    Energy: low in the morning. It is better towards evening and best late evening and night.     Side effects: denies      ADHD: denied   Depressive Disorder: irritable mood, appetite/weight change, sleep change, tired/fatigued, concentration problems, hopelessness, tearfulness/crying, more sad then depressed; more social to distract   Anxiety Disorder: anxiety/nervousness, panic attacks, compulsions, obsessions, fatigue, irritability, sleep problems, concentration problems, excessive worry, last panic was few months ago    Panic Disorder: nervous, rapid heart rate, chest pain, feels detached, shaky, nausea, difficulty breathing, shortness of breath, and sweating   Manic Disorder: irritable mood, racing thoughts, waxing/waning   Psychotic Disorder: Has heard voices in past but knows it " is not real.    Substance Use:  Alcohol: occasionally  and Caffeine: energy drinks -- 1 a day -- 200 mg caffeine              Past Psychiatric Hx:  Age at first contact - about 11 years age. Started with anxiety related nausea, vomiting, etc constantly  School was not easy for him.   2 older sisters were very good and went to the same school. One sister was a genius. Many teachers did not like him (that's how he felt and feels that way even now)  Moved to a different (private school) school was very good.  Zoloft was given up to the dose of 100 mg  He was on paxil too  Hospitalized for dehydration secondary to vomiting.   No suicide attempts  Counseling x multiple diff people  Hypnotherapy worked well for him.   Dx with depression and anxiety  He does not think that any of his medications help with his psych symptoms.    EXCERPTS FROM MS. RUTLEDGE'S (NP) NOTE DATED 11/1/2012:     Benita Rutledge NP - 11/01/2012 9:00 AM CDT  Formatting of this note is different from the original.  Subjective:    Patient ID: Huber Mahajan is a 11 y.o. male with a history of anxiety and vomiting episodes    HPI    11 year old male with a history of anxiety and vomiting episodes  His parents state that approximately 2 years ago he started having these episodes where he would vomit they would occur at different times of the day for different reasons in the summer during the school year sometimes riding in the car would make them worse they also see that they are heightened when he is excited or has something to look forward to but that's also related to him not sleeping well at night  One example father was taking in fishing he had trouble sleeping at night he got up early and he had several vomiting episodes while they were fishing and then he gets a headache and gets very tired    He describes these episodes as first he feels like his throat is getting tight and he will try to cough a little bit and then he vomits sometimes one time  sometimes several times that's followed by headache sometimes he vomits so much that he falls asleep and usually he feels better when he wakes up  His parents add that sometimes he has a blank stare on his face before the start and neck and less several seconds to several minutes his father describes it as though someone has taken him and spun him around and he just stops and he just stares.  They note that when they see this look he will start vomiting    There is no real pattern to these episodes they can occur a couple times a week sometimes they occur when he wakes up in the morning sometimes they occur in the evening    Initially these episodes were considered anxiety and he has been on Zoloft currently at 50 mg a day. He was recently instructed to increase to 75 mg they have not done this yet. 2 years ago when these episodes started he was placed on Paxil it made him too impulsive so that was changed to Zoloft.  Episodes have continued and have not improved since he has been on SSRIs    His mother states that he does well in school he makes A's. She doesn't see any focusing issues  He does have symptoms of anxiety including being fearful worried if his parents we homework he wheezes parents he is self-conscious he is fearful of new situations and he is very hard on himself according to his parents    Birth history    He was a 6 lbs. 11 oz. product of a 3o-year-old  4 para 4 mother. He was born at 37 weeks there were no complications    Developmental history    He walked at 12 months  He talked later and was in speech therapy    Medical history    He's had a normal hearing test immunizations are up to date he has no known drug allergies. They have been to an ENT to rule out motion sickness    Family history    Father is diagnosed with anxiety he is also diagnosed with Mikal-Parkinson-White syndrome since he was 21 years old.  There is a maternal second cousin with seizures  Mother has had one  migraine  Maternal grandmother  of brain cancer at age 45    Social history  Lives with his parents and 2 sisters one sister  at age 6 months    EEG - Normal (2012)     Abuse: denies     Family Psychiatric Hx:  Father takes Xanax for anxiety  Sisters x 2 both are on anxiety meds.   Suicide: STEP-GM committed suicide. Pt was close to her.       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   Occupation: Working at [employer] x 3 years. Does not like it. Works about 35-40 hours. Had two jobs in past.     Marital status: Has a GF x 3 years. She graduated marketing and works for [employer]. Children: - none.   Living situation: Apartment x 2 room mates. No pets. Fair relationship with his room mate. (He is dirty)    Sabianism: non Mu-ism ; went to [employer].     Legal: denies    Alcohol & Drug use History:   Alcohol: 2-3 times a month; drinks 2-3 drinks. No DUI.  MJ: denies  Other drugs: denies             2024     8:47 AM 11/10/2022     7:36 AM   Depression Patient Health Questionnaire   Over the last two weeks how often have you been bothered by little interest or pleasure in doing things Not at all Not at all    Over the last two weeks how often have you been bothered by feeling down, depressed or hopeless Several days Not at all   PHQ-2 Total Score 1 0       Data saved with a previous flowsheet row definition     0-4 = No intervention  5 to 9 = Mild  10 to 14 = Moderate  15 to 19 = Moderately severe  =20 = Severe      Review Of Systems:     GENERAL:  Gaining weight  SKIN:  No rashes or lacerations  HEAD:  Headaches  EYES:  No exophthalmos, jaundice or blindness  EARS:  No dizziness, tinnitus or hearing loss  NOSE:  No changes in smell  MOUTH & THROAT:  No dyskinetic movements or obvious goiter  CHEST:  No shortness of breath, hyperventilation or cough  CARDIOVASCULAR:  No tachycardia or chest pain  ABDOMEN:  No nausea,  vomiting, pain, constipation or diarrhea; none lately  URINARY:  No frequency, dysuria or sexual dysfunction  ENDOCRINE:  No polydipsia, polyuria  MUSCULOSKELETAL:  No pain or stiffness of the joints  NEUROLOGIC:  No weakness, sensory changes, seizures, confusion, memory loss, tremor or other abnormal movements    Current Evaluation:     Nutritional Screening: Considering the patient's height and weight, medications, medical history and preferences, should a referral be made to the dietitian? no    Constitutional  Vitals:  Most recent vital signs, dated less than 90 days prior to this appointment, were reviewed.    Vitals:    06/24/25 0901   BP: 126/84   Pulse: 62   Weight: 100.4 kg (221 lb 5.5 oz)        General:  unremarkable, age appropriate     Musculoskeletal  Muscle Strength/Tone:  no tremor, no tic   Gait & Station:  non-ataxic     Psychiatric  Appearance: casually dressed & groomed;   Behavior: calm,   Cooperation: cooperative with assessment  Speech: normal rate, volume, tone  Thought Process: linear, goal-directed  Thought Content: No suicidal or homicidal ideation; no delusions  Affect:anxious   Mood: anxious   Perceptions: No auditory or visual hallucinations  Level of Consciousness: alert throughout interview  Insight: fair  Cognition: Oriented to person, place, time, & situation  Memory: no apparent deficits to general clinical interview.   Attention/Concentration: no apparent deficits to general clinical interview.   Fund of Knowledge: average by vocabulary/education    Risk Parameters:  Patient reports no suicidal ideation  Patient reports no homicidal ideation  Patient reports no self-injurious behavior  Patient reports no violent behavior    Laboratory Data  No visits with results within 1 Month(s) from this visit.   Latest known visit with results is:   Lab Visit on 11/13/2024   Component Date Value Ref Range Status    Hemoglobin A1C 11/13/2024 5.2  4.0 - 5.6 % Final    Estimated Avg Glucose  11/13/2024 103  68 - 131 mg/dL Final    WBC 11/13/2024 6.36  3.90 - 12.70 K/uL Final    RBC 11/13/2024 5.30  4.60 - 6.20 M/uL Final    Hemoglobin 11/13/2024 15.7  14.0 - 18.0 g/dL Final    Hematocrit 11/13/2024 46.4  40.0 - 54.0 % Final    MCV 11/13/2024 88  82 - 98 fL Final    MCH 11/13/2024 29.6  27.0 - 31.0 pg Final    MCHC 11/13/2024 33.8  32.0 - 36.0 g/dL Final    RDW 11/13/2024 12.1  11.5 - 14.5 % Final    Platelets 11/13/2024 205  150 - 450 K/uL Final    MPV 11/13/2024 12.3  9.2 - 12.9 fL Final    Sodium 11/13/2024 140  136 - 145 mmol/L Final    Potassium 11/13/2024 4.3  3.5 - 5.1 mmol/L Final    Chloride 11/13/2024 106  95 - 110 mmol/L Final    CO2 11/13/2024 26  23 - 29 mmol/L Final    Glucose 11/13/2024 96  70 - 110 mg/dL Final    BUN 11/13/2024 15  6 - 20 mg/dL Final    Creatinine 11/13/2024 0.9  0.5 - 1.4 mg/dL Final    Calcium 11/13/2024 9.6  8.7 - 10.5 mg/dL Final    Total Protein 11/13/2024 6.9  6.0 - 8.4 g/dL Final    Albumin 11/13/2024 4.2  3.5 - 5.2 g/dL Final    Total Bilirubin 11/13/2024 1.4 (H)  0.1 - 1.0 mg/dL Final    Alkaline Phosphatase 11/13/2024 74  40 - 150 U/L Final    AST 11/13/2024 27  10 - 40 U/L Final    ALT 11/13/2024 20  10 - 44 U/L Final    eGFR 11/13/2024 >60.0  >60 mL/min/1.73 m^2 Final    Anion Gap 11/13/2024 8  8 - 16 mmol/L Final    TSH 11/13/2024 1.893  0.400 - 4.000 uIU/mL Final    Free T4 11/13/2024 1.04  0.71 - 1.51 ng/dL Final    Cholesterol 11/13/2024 119 (L)  120 - 199 mg/dL Final    Triglycerides 11/13/2024 65  30 - 150 mg/dL Final    HDL 11/13/2024 43  40 - 75 mg/dL Final    LDL Cholesterol 11/13/2024 63.0  63.0 - 159.0 mg/dL Final    HDL/Cholesterol Ratio 11/13/2024 36.1  20.0 - 50.0 % Final    Total Cholesterol/HDL Ratio 11/13/2024 2.8  2.0 - 5.0 Final    Non-HDL Cholesterol 11/13/2024 76  mg/dL Final       Medications  Encounter Medications[1]    Assessment - Diagnosis - Goals:     Impression:   This is a 24 year old SWM who suffers from anxiety, anxiety related  nausea, vomiting and GI symptoms since age 10. He has been investigated extensively, Seizure disorder has been rule out, and is deemed to suffer from anxiety and depression. He has not made any suicide attempts. No Psych adm. However, he was hospitalized for dehydration following a bad bout of GI symptoms due to anxiety.     Pt reports over thinking, second guessing, having obsessions and compulsions, worried, and having self doubts. Pt reports struggling with career issues. Wanted to become a  but not able to get into the school of his choice.  Family Hx of anxiety  No suicides in family   Denies having issues with alcohol or drugs    He did not have good response to SSRIs. At times having paradoxical effects    Pt suffers from ANAT, Insomnia, Depression and some OC symptoms.    PLAN:     (1) discussed diagnosis with pt. He agrees  (2) discussed using clonazepam 0.25 mg twice a day to help decrease anxiety, over thinking, OC symptoms.   (3) Start remeron 7.5 mg qhs and increase to 15 mg. Remeron is significantly less likely to cause GI symptoms.  (4) reviewed with pt side effects of clonazepam and Remeron. Pt asked clarifications and agreed with the trial  (5) check Vitamin B12, folic acid levels  (6) monitor side effects  (7) counseling done  (8) pt thankful for the help.       ICD-10-CM ICD-9-CM   1. Generalized anxiety disorder with panic attacks  F41.1 300.02    F41.0 300.01   2. Nausea  R11.0 787.02   3. Psychophysiological insomnia  F51.04 307.42   4. Depression, unspecified depression type  F32.A 311       Treatment Goals:  Specify outcomes written in observable, behavioral terms:   The treatment plan will use a biopsychosocial approach including self help strategies to achieve the goals of optimizing physical and mental health.    Anxiety: Use Psycho education, medications, and therapy as appropriate to decrease anxiety intensity, duration and frequency such that patient will have an improved  productivity and QOL.     Decrease frequency and intensity of panic attacks.     Decrease obsessions and compulsions with the use of coping skills, counseling and medications.     Depression: decrease or eliminate symptoms of depression using medications with limited/manageable side effects burden. Pt will have an improvement in productivity and QOL.     Decrease, eliminate or prevent suicidal thoughts and actions. Use safety plan including hospitalization to prevent suicidality.     Sleep: improve sleep duration, quality and consistency with sleep hygiene and medications.      Future treatment will utilize CBT, IOP, Mindfulness Techniques, ACT, Solution-focused Therapy, and Relaxation Techniques .     Treatment Plan/Recommendations:   As above     Safety Plan/Support System:   Pt contracts for safety; able to seek help     Return to Clinic: 2 weeks      Time spent with pt including note preparation: 90 minutes    This includes face to face time and non-face to face time preparing to see the patient (eg, review of tests), obtaining and/or reviewing separately obtained history, documenting clinical information in the electronic or other health record, independently interpreting results and communicating results to the patient/family/caregiver, or care coordinator.          Christopher Tejada MD  Psychiatry         [1]   Outpatient Encounter Medications as of 6/24/2025   Medication Sig Dispense Refill    clonazePAM (KLONOPIN) 0.25 MG TbDL Take 1 tablet (0.25 mg total) by mouth 2 (two) times daily. 60 tablet 0    finasteride (PROPECIA) 1 mg tablet Take 1 mg by mouth once daily.      hydrOXYzine HCL (ATARAX) 10 MG Tab Take 1 tablet (10 mg total) by mouth 3 (three) times daily as needed (insomnia, anxiety, panic attacks,). 30 tablet 2    minoxidiL (LONITEN) 2.5 MG tablet Take 2.5 mg by mouth once daily.      mirtazapine (REMERON) 15 MG tablet Please take 1/2 pill at bedtime for one week and full pill by mouth after that.  30 tablet 0    ondansetron (ZOFRAN-ODT) 4 MG TbDL Take 1 tablet (4 mg total) by mouth every 12 (twelve) hours as needed (nausea). 30 tablet 1    scopolamine (TRANSDERM-SCOP) 1.3-1.5 mg (1 mg over 3 days) Place 1 patch onto the skin every 72 hours. 6 patch 2    [DISCONTINUED] EScitalopram oxalate (LEXAPRO) 10 MG tablet Take 1 tablet (10 mg total) by mouth once daily. (Patient not taking: Reported on 6/24/2025) 90 tablet 3     No facility-administered encounter medications on file as of 6/24/2025.

## 2025-06-25 NOTE — PATIENT INSTRUCTIONS

## 2025-07-09 ENCOUNTER — OFFICE VISIT (OUTPATIENT)
Dept: PSYCHIATRY | Facility: CLINIC | Age: 25
End: 2025-07-09
Payer: COMMERCIAL

## 2025-07-09 DIAGNOSIS — F41.0 GENERALIZED ANXIETY DISORDER WITH PANIC ATTACKS: Primary | ICD-10-CM

## 2025-07-09 DIAGNOSIS — F41.1 GENERALIZED ANXIETY DISORDER WITH PANIC ATTACKS: Primary | ICD-10-CM

## 2025-07-09 DIAGNOSIS — F51.04 PSYCHOPHYSIOLOGICAL INSOMNIA: ICD-10-CM

## 2025-07-09 DIAGNOSIS — F32.A DEPRESSION, UNSPECIFIED DEPRESSION TYPE: ICD-10-CM

## 2025-07-09 PROCEDURE — 1159F MED LIST DOCD IN RCRD: CPT | Mod: CPTII,S$GLB,, | Performed by: PSYCHIATRY & NEUROLOGY

## 2025-07-09 PROCEDURE — 1160F RVW MEDS BY RX/DR IN RCRD: CPT | Mod: CPTII,S$GLB,, | Performed by: PSYCHIATRY & NEUROLOGY

## 2025-07-09 PROCEDURE — 99215 OFFICE O/P EST HI 40 MIN: CPT | Mod: S$GLB,,, | Performed by: PSYCHIATRY & NEUROLOGY

## 2025-07-09 PROCEDURE — 99999 PR PBB SHADOW E&M-EST. PATIENT-LVL II: CPT | Mod: PBBFAC,,, | Performed by: PSYCHIATRY & NEUROLOGY

## 2025-07-09 RX ORDER — CLONAZEPAM 0.25 MG/1
0.25 TABLET, ORALLY DISINTEGRATING ORAL 2 TIMES DAILY
Qty: 60 TABLET | Refills: 0 | Status: SHIPPED | OUTPATIENT
Start: 2025-07-09 | End: 2025-08-08

## 2025-07-09 RX ORDER — FLUOXETINE 10 MG/1
10 CAPSULE ORAL DAILY
Qty: 30 CAPSULE | Refills: 0 | Status: SHIPPED | OUTPATIENT
Start: 2025-07-09 | End: 2025-08-08

## 2025-07-09 NOTE — PROGRESS NOTES
"Outpatient Psychiatry Follow-Up Visit     7/9/2025      Clinical Status of Patient:  Outpatient (Ambulatory)    Chief Complaint:  Huber Mahajan is a 24 y.o. male who presents today for Medication Management follow-up Visit.      Preferred Name: Huber    FIRST VISIT: This is a 24 year old SWM who suffers from anxiety, anxiety related nausea, vomiting and GI symptoms since age 10. He has been investigated extensively, Seizure disorder has been rule out, and is deemed to suffer from anxiety and depression. He has not made any suicide attempts. No Psych adm. However, he was hospitalized for dehydration following a bad bout of GI symptoms due to anxiety.      Pt reports over thinking, second guessing, having obsessions and compulsions, worried, and having self doubts. Pt reports struggling with career issues. Wanted to become a  but not able to get into the school of his choice.  Family Hx of anxiety  No suicides in family   Denies having issues with alcohol or drugs     He did not have good response to SSRIs. At times having paradoxical effects     Pt suffers from ANAT, Insomnia, Depression and some OC symptoms.     PLAN:      (1) discussed diagnosis with pt. He agrees  (2) discussed using clonazepam 0.25 mg twice a day to help decrease anxiety, over thinking, OC symptoms.   (3) Start remeron 7.5 mg qhs and increase to 15 mg. Remeron is significantly less likely to cause GI symptoms.  (4) reviewed with pt side effects of clonazepam and Remeron. Pt asked clarifications and agreed with the trial  (5) check Vitamin B12, folic acid levels  (6) monitor side effects  (7) counseling done  (8) pt thankful for the help.       CURRENT PRESENTATION:   Doing fine  Trying to develop a routine  Has not taken Remeron.   Discussing with his family about career. Talking with  helped him put a pause to becoming a .    New Issues  None    Sleep: better. Going to bed early. Sleeps about 7-9 hours. "I have to better with " "sleep -- it is not a mental thing"    Appetite: "less enticed to eat". He wants to lose weight. Eating two meals. He wants to reach about 185-190 lbs. That is his usual weight    Energy: fine; variable. Yesterday was the best day so far.   Side effects:  denies.   No nausea or vomiting since last visit.         11/13/2024     8:47 AM 11/10/2022     7:36 AM   Depression Patient Health Questionnaire   Over the last two weeks how often have you been bothered by little interest or pleasure in doing things Not at all Not at all    Over the last two weeks how often have you been bothered by feeling down, depressed or hopeless Several days Not at all   PHQ-2 Total Score 1 0       Data saved with a previous flowsheet row definition     0-4 = No intervention  5 to 9 = Mild  10 to 14 = Moderate  15 to 19 = Moderately severe  =20 = Severe    Review of Systems   PSYCHIATRIC: Pertinant items are noted in the narrative.    Past Medical, Family and Social History: The patient's past medical, family and social history have been reviewed and updated as appropriate within the electronic medical record - see encounter notes.    Laboratory Data  No visits with results within 1 Month(s) from this visit.   Latest known visit with results is:   Lab Visit on 11/13/2024   Component Date Value Ref Range Status    Hemoglobin A1C 11/13/2024 5.2  4.0 - 5.6 % Final    Estimated Avg Glucose 11/13/2024 103  68 - 131 mg/dL Final    WBC 11/13/2024 6.36  3.90 - 12.70 K/uL Final    RBC 11/13/2024 5.30  4.60 - 6.20 M/uL Final    Hemoglobin 11/13/2024 15.7  14.0 - 18.0 g/dL Final    Hematocrit 11/13/2024 46.4  40.0 - 54.0 % Final    MCV 11/13/2024 88  82 - 98 fL Final    MCH 11/13/2024 29.6  27.0 - 31.0 pg Final    MCHC 11/13/2024 33.8  32.0 - 36.0 g/dL Final    RDW 11/13/2024 12.1  11.5 - 14.5 % Final    Platelets 11/13/2024 205  150 - 450 K/uL Final    MPV 11/13/2024 12.3  9.2 - 12.9 fL Final    Sodium 11/13/2024 140  136 - 145 mmol/L Final    " Potassium 11/13/2024 4.3  3.5 - 5.1 mmol/L Final    Chloride 11/13/2024 106  95 - 110 mmol/L Final    CO2 11/13/2024 26  23 - 29 mmol/L Final    Glucose 11/13/2024 96  70 - 110 mg/dL Final    BUN 11/13/2024 15  6 - 20 mg/dL Final    Creatinine 11/13/2024 0.9  0.5 - 1.4 mg/dL Final    Calcium 11/13/2024 9.6  8.7 - 10.5 mg/dL Final    Total Protein 11/13/2024 6.9  6.0 - 8.4 g/dL Final    Albumin 11/13/2024 4.2  3.5 - 5.2 g/dL Final    Total Bilirubin 11/13/2024 1.4 (H)  0.1 - 1.0 mg/dL Final    Alkaline Phosphatase 11/13/2024 74  40 - 150 U/L Final    AST 11/13/2024 27  10 - 40 U/L Final    ALT 11/13/2024 20  10 - 44 U/L Final    eGFR 11/13/2024 >60.0  >60 mL/min/1.73 m^2 Final    Anion Gap 11/13/2024 8  8 - 16 mmol/L Final    TSH 11/13/2024 1.893  0.400 - 4.000 uIU/mL Final    Free T4 11/13/2024 1.04  0.71 - 1.51 ng/dL Final    Cholesterol 11/13/2024 119 (L)  120 - 199 mg/dL Final    Triglycerides 11/13/2024 65  30 - 150 mg/dL Final    HDL 11/13/2024 43  40 - 75 mg/dL Final    LDL Cholesterol 11/13/2024 63.0  63.0 - 159.0 mg/dL Final    HDL/Cholesterol Ratio 11/13/2024 36.1  20.0 - 50.0 % Final    Total Cholesterol/HDL Ratio 11/13/2024 2.8  2.0 - 5.0 Final    Non-HDL Cholesterol 11/13/2024 76  mg/dL Final       Medications  Encounter Medications[1]        Risk Parameters:  Patient reports no suicidal ideation  Patient reports no homicidal ideation  Patient reports no self-injurious behavior  Patient reports no violent behavior    Exam (detailed: at least 9 elements; comprehensive: all 15 elements)   Constitutional  Vitals:  Most recent vital signs were reviewed.   Last 3 sets of Vitals        11/13/2024     8:43 AM 4/7/2025     7:22 AM 6/24/2025     9:01 AM   Vitals - 1 value per visit   SYSTOLIC 116 110 126   DIASTOLIC 68 68 84   Pulse 69 59 62   Temp 98 °F (36.7 °C) 94.3 °F (34.6 °C)    Resp 18     SPO2 98 % 98 %    Weight (lb) 206.9 205.69 221.34   Weight (kg) 93.85 93.3 100.4   Height 6' (1.829 m) 6' (1.829 m)     BMI (Calculated) 28.1 27.9    Pain Score Three Zero           General:  unremarkable, age appropriate, casually dressed, wearing baseball cap     Musculoskeletal  Muscle Strength/Tone:  not examined   Gait & Station:  non-ataxic     Psychiatric  Appearance: casually dressed & groomed;   Behavior: calm,   Cooperation: cooperative with assessment  Speech: normal rate, volume, tone  Thought Process: linear, goal-directed  Thought Content: No suicidal or homicidal ideation; no delusions  Affect: decreased range  Mood: anxious  Perceptions: No auditory or visual hallucinations  Level of Consciousness: alert throughout interview  Insight: fair  Cognition: Oriented to person, place, time, & situation  Memory: no apparent deficits to general clinical interview.  Attention/Concentration: no apparent deficits to general clinical interview.  Fund of Knowledge: average by vocabulary/education      Assessment and Diagnosis   Status/Progress: Based on the examination today, the patient's problem(s) is/are resolving.  New problems have not been presented today.   Co-morbidities and Lack of compliance are complicating management of the primary condition.       Encounter Diagnoses   Name Primary?    Generalized anxiety disorder with panic attacks Yes    Psychophysiological insomnia     Depression, unspecified depression type        General Impression & Treatment Plan:     Pt reports that he is feeling better; he is developing a schedule and trying to have more structure to his time. Doing more activity to help him sleep better; following more sleep hygiene rules.  Trying to manage his weight -- portion control  Pt did not take Remeron as he was worried about side effects. His family also advised him not to take it as they felt he was not really depressed.  Pt continues to over think, doubt, have ambivalence. He is not suicidal.  He did well in therapy in past and is considering it.   Pt's sisters have anxiety and OCD. One sister  "is doing well on prozac. He would like to consider that option.  Pt has not had any nausea or vomiting since last session.    PLAN:    (1) continue clonazepam for now  (2) D/c Remeron -- pt choice. Did not try it at all  (3) Agrees with counseling. He will return to the place he had been  (4) "Feeling good" Will try to read this book  (5) Prozac 10  mg po daily. Pt choice. Agrees with a trial  (6) monitor side effects  (7) counseling done     Return to Clinic: 1 month    Medication List with Changes/Refills   New Medications    FLUOXETINE 10 MG CAPSULE    Take 1 capsule (10 mg total) by mouth once daily.   Current Medications    FINASTERIDE (PROPECIA) 1 MG TABLET    Take 1 mg by mouth once daily.    HYDROXYZINE HCL (ATARAX) 10 MG TAB    Take 1 tablet (10 mg total) by mouth 3 (three) times daily as needed (insomnia, anxiety, panic attacks,).    MINOXIDIL (LONITEN) 2.5 MG TABLET    Take 2.5 mg by mouth once daily.    ONDANSETRON (ZOFRAN-ODT) 4 MG TBDL    Take 1 tablet (4 mg total) by mouth every 12 (twelve) hours as needed (nausea).    SCOPOLAMINE (TRANSDERM-SCOP) 1.3-1.5 MG (1 MG OVER 3 DAYS)    Place 1 patch onto the skin every 72 hours.   Changed and/or Refilled Medications    Modified Medication Previous Medication    CLONAZEPAM (KLONOPIN) 0.25 MG TBDL clonazePAM (KLONOPIN) 0.25 MG TbDL       Take 1 tablet (0.25 mg total) by mouth 2 (two) times daily.    Take 1 tablet (0.25 mg total) by mouth 2 (two) times daily.   Discontinued Medications    MIRTAZAPINE (REMERON) 15 MG TABLET    Please take 1/2 pill at bedtime for one week and full pill by mouth after that.          Time spent with pt including note preparation: 45 minutes. This includes face to face time and non-face to face time preparing to see the patient (eg, review of tests), obtaining and/or reviewing separately obtained history, documenting clinical information in the electronic or other health record, independently interpreting results and communicating " results to the patient/family/caregiver, or care coordinator.         Christopher Tejada MD  Psychiatry         [1]   Outpatient Encounter Medications as of 7/9/2025   Medication Sig Dispense Refill    clonazePAM (KLONOPIN) 0.25 MG TbDL Take 1 tablet (0.25 mg total) by mouth 2 (two) times daily. 60 tablet 0    finasteride (PROPECIA) 1 mg tablet Take 1 mg by mouth once daily.      FLUoxetine 10 MG capsule Take 1 capsule (10 mg total) by mouth once daily. 30 capsule 0    hydrOXYzine HCL (ATARAX) 10 MG Tab Take 1 tablet (10 mg total) by mouth 3 (three) times daily as needed (insomnia, anxiety, panic attacks,). 30 tablet 2    minoxidiL (LONITEN) 2.5 MG tablet Take 2.5 mg by mouth once daily.      ondansetron (ZOFRAN-ODT) 4 MG TbDL Take 1 tablet (4 mg total) by mouth every 12 (twelve) hours as needed (nausea). 30 tablet 1    scopolamine (TRANSDERM-SCOP) 1.3-1.5 mg (1 mg over 3 days) Place 1 patch onto the skin every 72 hours. 6 patch 2    [DISCONTINUED] clonazePAM (KLONOPIN) 0.25 MG TbDL Take 1 tablet (0.25 mg total) by mouth 2 (two) times daily. 60 tablet 0    [DISCONTINUED] mirtazapine (REMERON) 15 MG tablet Please take 1/2 pill at bedtime for one week and full pill by mouth after that. 30 tablet 0     No facility-administered encounter medications on file as of 7/9/2025.

## 2025-07-09 NOTE — PATIENT INSTRUCTIONS

## 2025-07-25 DIAGNOSIS — F41.0 GENERALIZED ANXIETY DISORDER WITH PANIC ATTACKS: ICD-10-CM

## 2025-07-25 DIAGNOSIS — F41.1 GENERALIZED ANXIETY DISORDER WITH PANIC ATTACKS: ICD-10-CM

## 2025-07-28 RX ORDER — CLONAZEPAM 0.25 MG/1
0.25 TABLET, ORALLY DISINTEGRATING ORAL 2 TIMES DAILY
Qty: 60 TABLET | Refills: 0 | Status: SHIPPED | OUTPATIENT
Start: 2025-07-28 | End: 2025-08-27

## 2025-08-13 ENCOUNTER — OFFICE VISIT (OUTPATIENT)
Dept: PSYCHIATRY | Facility: CLINIC | Age: 25
End: 2025-08-13
Payer: COMMERCIAL

## 2025-08-13 VITALS
DIASTOLIC BLOOD PRESSURE: 80 MMHG | WEIGHT: 215.63 LBS | BODY MASS INDEX: 29.24 KG/M2 | SYSTOLIC BLOOD PRESSURE: 127 MMHG | HEART RATE: 67 BPM

## 2025-08-13 DIAGNOSIS — F41.0 GENERALIZED ANXIETY DISORDER WITH PANIC ATTACKS: Primary | ICD-10-CM

## 2025-08-13 DIAGNOSIS — F41.1 GENERALIZED ANXIETY DISORDER WITH PANIC ATTACKS: Primary | ICD-10-CM

## 2025-08-13 DIAGNOSIS — F51.04 PSYCHOPHYSIOLOGICAL INSOMNIA: ICD-10-CM

## 2025-08-13 DIAGNOSIS — F32.A DEPRESSION, UNSPECIFIED DEPRESSION TYPE: ICD-10-CM

## 2025-08-13 DIAGNOSIS — R11.0 NAUSEA: ICD-10-CM

## 2025-08-13 PROCEDURE — 99999 PR PBB SHADOW E&M-EST. PATIENT-LVL III: CPT | Mod: PBBFAC,,, | Performed by: PSYCHIATRY & NEUROLOGY

## 2025-08-13 PROCEDURE — 3079F DIAST BP 80-89 MM HG: CPT | Mod: CPTII,S$GLB,, | Performed by: PSYCHIATRY & NEUROLOGY

## 2025-08-13 PROCEDURE — 99215 OFFICE O/P EST HI 40 MIN: CPT | Mod: S$GLB,,, | Performed by: PSYCHIATRY & NEUROLOGY

## 2025-08-13 PROCEDURE — G2211 COMPLEX E/M VISIT ADD ON: HCPCS | Mod: S$GLB,,, | Performed by: PSYCHIATRY & NEUROLOGY

## 2025-08-13 PROCEDURE — 3008F BODY MASS INDEX DOCD: CPT | Mod: CPTII,S$GLB,, | Performed by: PSYCHIATRY & NEUROLOGY

## 2025-08-13 PROCEDURE — 1159F MED LIST DOCD IN RCRD: CPT | Mod: CPTII,S$GLB,, | Performed by: PSYCHIATRY & NEUROLOGY

## 2025-08-13 PROCEDURE — 3074F SYST BP LT 130 MM HG: CPT | Mod: CPTII,S$GLB,, | Performed by: PSYCHIATRY & NEUROLOGY

## 2025-08-13 PROCEDURE — 1160F RVW MEDS BY RX/DR IN RCRD: CPT | Mod: CPTII,S$GLB,, | Performed by: PSYCHIATRY & NEUROLOGY

## 2025-08-13 RX ORDER — FLUOXETINE 10 MG/1
10 CAPSULE ORAL DAILY
Qty: 30 CAPSULE | Refills: 1 | Status: SHIPPED | OUTPATIENT
Start: 2025-08-13 | End: 2025-10-12

## 2025-08-13 RX ORDER — BUSPIRONE HYDROCHLORIDE 10 MG/1
10 TABLET ORAL 2 TIMES DAILY
Qty: 60 TABLET | Refills: 1 | Status: SHIPPED | OUTPATIENT
Start: 2025-08-13 | End: 2025-10-12

## 2025-08-13 RX ORDER — CLONAZEPAM 0.25 MG/1
0.25 TABLET, ORALLY DISINTEGRATING ORAL 2 TIMES DAILY
Qty: 60 TABLET | Refills: 1 | Status: SHIPPED | OUTPATIENT
Start: 2025-08-13 | End: 2025-10-12

## (undated) DEVICE — GOWN SMARTGOWN LVL4 X-LONG XL

## (undated) DEVICE — SUPPORT ULNA NERVE PROTECTOR

## (undated) DEVICE — DRAPE VERTICAL ISOLATION

## (undated) DEVICE — BNDG COFLEX FOAM LF2 ST 4X5YD

## (undated) DEVICE — SYR 50CC LL

## (undated) DEVICE — SEE MEDLINE ITEM 152529

## (undated) DEVICE — GLOVE SURG ULTRA TOUCH 7

## (undated) DEVICE — TOWEL OR DISP STRL BLUE 4/PK

## (undated) DEVICE — NDL SAFETY 22G X 1.5 ECLIPSE

## (undated) DEVICE — COVER LIGHT HANDLE 80/CA

## (undated) DEVICE — DRAPE SURGICAL STERI IRRG PCH

## (undated) DEVICE — GLOVE BIOGEL PI ORTHO PRO 7.5

## (undated) DEVICE — BLADE SAMURAI CURVED

## (undated) DEVICE — CANNULA FLOWPORT II 165MM

## (undated) DEVICE — MANIFOLD 4 PORT

## (undated) DEVICE — Device

## (undated) DEVICE — SEE MEDLINE ITEM 146292

## (undated) DEVICE — TUBING SUCTION STRAIGHT .25X20

## (undated) DEVICE — POSITIONER HEAD DONUT 9IN FOAM

## (undated) DEVICE — BIT DRILL CINCHLOCK SS

## (undated) DEVICE — SUT BLU BR 2 TAPERD NDL 1/2

## (undated) DEVICE — SYR 10CC LUER LOCK

## (undated) DEVICE — DRAPE INCISE IOBAN 2 23X33IN

## (undated) DEVICE — SUT NANOPASS REACH CRESCENT

## (undated) DEVICE — DRAPE PLASTIC U 60X72

## (undated) DEVICE — SLINGSHOT 70DEG UP

## (undated) DEVICE — SOL IRR NACL .9% 3000ML

## (undated) DEVICE — UNDERGLOVE BIOGEL PI SZ 6.5 LF

## (undated) DEVICE — APPLICATOR CHLORAPREP ORN 26ML

## (undated) DEVICE — UNDERGLOVES BIOGEL PI SZ 7 LF

## (undated) DEVICE — GLOVE BIOGEL ORTHOPEDIC 7.5

## (undated) DEVICE — CANNULA FLOWPORT OBTURATOR

## (undated) DEVICE — PAD INSERT BOOT DISPOSABLE

## (undated) DEVICE — TUBING PUMP ARTHROSCOPY STRL

## (undated) DEVICE — UNDERGLOVES BIOGEL PI SIZE 8

## (undated) DEVICE — PAD ABD 8X10 STERILE

## (undated) DEVICE — UNDERGLOVES BIOGEL PI SIZE 7.5

## (undated) DEVICE — SYR ONLY LUER LOCK 20CC

## (undated) DEVICE — ELECTRODE REM PLYHSV RETURN 9

## (undated) DEVICE — CANNULA TRANSPORT 8MM 7 8 9

## (undated) DEVICE — DRAPE INCISE IOBAN 2 23X17IN

## (undated) DEVICE — DRAPE MOBILE C-ARM

## (undated) DEVICE — MAT SURGICAL ECOSUCTIONER

## (undated) DEVICE — GAUZE SPONGE 4X4 12PLY

## (undated) DEVICE — KIT PORTAL ENTRY

## (undated) DEVICE — GLOVE SURGICAL LATEX SZ 6.5

## (undated) DEVICE — SUT ETHILON 3-0 PS2 18 BLK

## (undated) DEVICE — PROBE MULTI PORT RF 90 DEGREE

## (undated) DEVICE — SEE MEDLINE ITEM 157131

## (undated) DEVICE — SEE MEDLINE ITEM 157027

## (undated) DEVICE — GLOVE SURG ULTRA TOUCH 7.5

## (undated) DEVICE — PAD PERINEAL SUPINE

## (undated) DEVICE — DRAPE STERI U-SHAPED 47X51IN

## (undated) DEVICE — GLOVE SURG BIOGEL LATEX SZ 7.5

## (undated) DEVICE — COVER CAMERA OPERATING ROOM